# Patient Record
Sex: MALE | Race: WHITE | NOT HISPANIC OR LATINO | Employment: OTHER | URBAN - METROPOLITAN AREA
[De-identification: names, ages, dates, MRNs, and addresses within clinical notes are randomized per-mention and may not be internally consistent; named-entity substitution may affect disease eponyms.]

---

## 2018-02-27 PROBLEM — R53.82 CHRONIC FATIGUE: Status: ACTIVE | Noted: 2017-11-30

## 2018-02-27 PROBLEM — K21.9 GASTROESOPHAGEAL REFLUX DISEASE WITHOUT ESOPHAGITIS: Status: ACTIVE | Noted: 2017-02-27

## 2018-03-01 PROCEDURE — 82570 ASSAY OF URINE CREATININE: CPT | Performed by: FAMILY MEDICINE

## 2018-03-01 PROCEDURE — 82043 UR ALBUMIN QUANTITATIVE: CPT | Performed by: FAMILY MEDICINE

## 2018-03-01 PROCEDURE — 81001 URINALYSIS AUTO W/SCOPE: CPT | Performed by: FAMILY MEDICINE

## 2018-03-20 PROCEDURE — 84100 ASSAY OF PHOSPHORUS: CPT | Performed by: INTERNAL MEDICINE

## 2018-03-20 PROCEDURE — 82565 ASSAY OF CREATININE: CPT | Performed by: INTERNAL MEDICINE

## 2018-03-20 PROCEDURE — 83970 ASSAY OF PARATHORMONE: CPT | Performed by: INTERNAL MEDICINE

## 2018-03-20 PROCEDURE — 82310 ASSAY OF CALCIUM: CPT | Performed by: INTERNAL MEDICINE

## 2018-06-14 PROCEDURE — 82570 ASSAY OF URINE CREATININE: CPT | Performed by: INTERNAL MEDICINE

## 2018-06-14 PROCEDURE — 82043 UR ALBUMIN QUANTITATIVE: CPT | Performed by: INTERNAL MEDICINE

## 2019-04-09 PROCEDURE — 86256 FLUORESCENT ANTIBODY TITER: CPT | Performed by: INTERNAL MEDICINE

## 2019-04-09 PROCEDURE — 82784 ASSAY IGA/IGD/IGG/IGM EACH: CPT | Performed by: INTERNAL MEDICINE

## 2020-04-17 PROBLEM — Z85.51 HISTORY OF BLADDER CANCER: Status: ACTIVE | Noted: 2020-04-17

## 2020-04-17 PROBLEM — K43.5 PARASTOMAL HERNIA OF ILEAL CONDUIT: Status: ACTIVE | Noted: 2020-04-17

## 2020-04-17 PROBLEM — R31.0 GROSS HEMATURIA: Status: ACTIVE | Noted: 2020-04-17

## 2020-04-24 PROBLEM — N36.8 URETHRAL BLEEDING: Status: ACTIVE | Noted: 2020-04-24

## 2020-04-24 PROCEDURE — 88108 CYTOPATH CONCENTRATE TECH: CPT | Performed by: UROLOGY

## 2020-05-12 RX ORDER — LISINOPRIL 10 MG/1
10 TABLET ORAL DAILY
COMMUNITY

## 2020-05-12 RX ORDER — ATORVASTATIN CALCIUM 40 MG/1
40 TABLET, FILM COATED ORAL DAILY
COMMUNITY

## 2020-05-12 RX ORDER — GLIMEPIRIDE 4 MG/1
4 TABLET ORAL
COMMUNITY

## 2020-05-15 DIAGNOSIS — Z01.812 PRE-PROCEDURAL LABORATORY EXAMINATION: Primary | ICD-10-CM

## 2020-05-16 ENCOUNTER — LAB SERVICES (OUTPATIENT)
Dept: LAB | Age: 75
End: 2020-05-16

## 2020-05-16 DIAGNOSIS — Z01.812 PRE-PROCEDURAL LABORATORY EXAMINATION: ICD-10-CM

## 2020-05-16 LAB
SARS-COV-2 RNA SPEC QL NAA+PROBE: NOT DETECTED
SERVICE CMNT-IMP: NORMAL
SPECIMEN SOURCE: NORMAL

## 2020-05-19 ENCOUNTER — HOSPITAL ENCOUNTER (OUTPATIENT)
Age: 75
Discharge: HOME | End: 2020-05-19
Attending: UROLOGY | Admitting: UROLOGY

## 2020-05-19 ENCOUNTER — ANESTHESIA (OUTPATIENT)
Dept: SURGERY | Age: 75
End: 2020-05-19

## 2020-05-19 ENCOUNTER — ANESTHESIA EVENT (OUTPATIENT)
Dept: SURGERY | Age: 75
End: 2020-05-19

## 2020-05-19 LAB
GLUCOSE BLDC GLUCOMTR-MCNC: 158 MG/DL (ref 70–99)
GLUCOSE BLDC GLUCOMTR-MCNC: 160 MG/DL (ref 70–99)

## 2020-05-19 PROCEDURE — 10002807 HB RX 258: Performed by: ANESTHESIOLOGY

## 2020-05-19 PROCEDURE — 10002801 HB RX 250 W/O HCPCS: Performed by: ANESTHESIOLOGY

## 2020-05-19 PROCEDURE — 13000003 HB ANESTHESIA  GENERAL EA ADD MINUTE: Performed by: UROLOGY

## 2020-05-19 PROCEDURE — 10002800 HB RX 250 W HCPCS: Performed by: ANESTHESIOLOGY

## 2020-05-19 PROCEDURE — 10002807 HB RX 258

## 2020-05-19 PROCEDURE — 10004451 HB PACU RECOVERY 1ST 30 MINUTES: Performed by: UROLOGY

## 2020-05-19 PROCEDURE — 10002803 HB RX 637: Performed by: UROLOGY

## 2020-05-19 PROCEDURE — 88305 TISSUE EXAM BY PATHOLOGIST: CPT

## 2020-05-19 PROCEDURE — 13000036 HB COMPLEX  CASE S/U + 1ST 15 MIN: Performed by: UROLOGY

## 2020-05-19 PROCEDURE — 82962 GLUCOSE BLOOD TEST: CPT

## 2020-05-19 PROCEDURE — 13000037 HB COMPLEX CASE EACH ADD MINUTE: Performed by: UROLOGY

## 2020-05-19 PROCEDURE — 10002800 HB RX 250 W HCPCS: Performed by: UROLOGY

## 2020-05-19 PROCEDURE — 13000002 HB ANESTHESIA  GENERAL  S/U + 1ST 15 MIN: Performed by: UROLOGY

## 2020-05-19 PROCEDURE — 10002803 HB RX 637

## 2020-05-19 PROCEDURE — 13000001 HB PHASE II RECOVERY EA 30 MINUTES: Performed by: UROLOGY

## 2020-05-19 RX ORDER — DEXTROSE MONOHYDRATE 25 G/50ML
25 INJECTION, SOLUTION INTRAVENOUS PRN
Status: DISCONTINUED | OUTPATIENT
Start: 2020-05-19 | End: 2020-05-19 | Stop reason: HOSPADM

## 2020-05-19 RX ORDER — LIDOCAINE AND PRILOCAINE 25; 25 MG/G; MG/G
1 CREAM TOPICAL PRN
Status: DISCONTINUED | OUTPATIENT
Start: 2020-05-19 | End: 2020-05-19 | Stop reason: HOSPADM

## 2020-05-19 RX ORDER — MIDAZOLAM HYDROCHLORIDE 1 MG/ML
INJECTION, SOLUTION INTRAMUSCULAR; INTRAVENOUS PRN
Status: DISCONTINUED | OUTPATIENT
Start: 2020-05-19 | End: 2020-05-19

## 2020-05-19 RX ORDER — HYDROCODONE BITARTRATE AND ACETAMINOPHEN 5; 325 MG/1; MG/1
1 TABLET ORAL EVERY 6 HOURS PRN
Qty: 10 TABLET | Refills: 0 | Status: SHIPPED | OUTPATIENT
Start: 2020-05-19

## 2020-05-19 RX ORDER — PROPOFOL 10 MG/ML
INJECTION, EMULSION INTRAVENOUS PRN
Status: DISCONTINUED | OUTPATIENT
Start: 2020-05-19 | End: 2020-05-19

## 2020-05-19 RX ORDER — NEOSTIGMINE METHYLSULFATE 1 MG/ML
INJECTION, SOLUTION INTRAVENOUS PRN
Status: DISCONTINUED | OUTPATIENT
Start: 2020-05-19 | End: 2020-05-19

## 2020-05-19 RX ORDER — METOCLOPRAMIDE HYDROCHLORIDE 5 MG/ML
5 INJECTION INTRAMUSCULAR; INTRAVENOUS EVERY 6 HOURS PRN
Status: DISCONTINUED | OUTPATIENT
Start: 2020-05-19 | End: 2020-05-19 | Stop reason: HOSPADM

## 2020-05-19 RX ORDER — ROCURONIUM BROMIDE 10 MG/ML
INJECTION, SOLUTION INTRAVENOUS PRN
Status: DISCONTINUED | OUTPATIENT
Start: 2020-05-19 | End: 2020-05-19

## 2020-05-19 RX ORDER — 0.9 % SODIUM CHLORIDE 0.9 %
2 VIAL (ML) INJECTION EVERY 12 HOURS SCHEDULED
Status: DISCONTINUED | OUTPATIENT
Start: 2020-05-19 | End: 2020-05-19 | Stop reason: HOSPADM

## 2020-05-19 RX ORDER — DEXAMETHASONE SODIUM PHOSPHATE 4 MG/ML
INJECTION, SOLUTION INTRA-ARTICULAR; INTRALESIONAL; INTRAMUSCULAR; INTRAVENOUS; SOFT TISSUE PRN
Status: DISCONTINUED | OUTPATIENT
Start: 2020-05-19 | End: 2020-05-19

## 2020-05-19 RX ORDER — FAMOTIDINE 20 MG/1
TABLET, FILM COATED ORAL
Status: DISCONTINUED
Start: 2020-05-19 | End: 2020-05-19 | Stop reason: HOSPADM

## 2020-05-19 RX ORDER — CHLORHEXIDINE GLUCONATE ORAL RINSE 1.2 MG/ML
15 SOLUTION DENTAL
Status: COMPLETED | OUTPATIENT
Start: 2020-05-19 | End: 2020-05-19

## 2020-05-19 RX ORDER — SODIUM CHLORIDE, SODIUM LACTATE, POTASSIUM CHLORIDE, CALCIUM CHLORIDE 600; 310; 30; 20 MG/100ML; MG/100ML; MG/100ML; MG/100ML
INJECTION, SOLUTION INTRAVENOUS CONTINUOUS PRN
Status: DISCONTINUED | OUTPATIENT
Start: 2020-05-19 | End: 2020-05-19

## 2020-05-19 RX ORDER — DOCUSATE SODIUM 100 MG/1
100 CAPSULE, LIQUID FILLED ORAL 2 TIMES DAILY PRN
Qty: 30 CAPSULE | Refills: 0 | Status: SHIPPED | OUTPATIENT
Start: 2020-05-19

## 2020-05-19 RX ORDER — LIDOCAINE HYDROCHLORIDE 10 MG/ML
5-10 INJECTION, SOLUTION INFILTRATION; PERINEURAL PRN
Status: DISCONTINUED | OUTPATIENT
Start: 2020-05-19 | End: 2020-05-19 | Stop reason: HOSPADM

## 2020-05-19 RX ORDER — HUMAN INSULIN 100 [IU]/ML
INJECTION, SOLUTION SUBCUTANEOUS
Status: DISCONTINUED | OUTPATIENT
Start: 2020-05-19 | End: 2020-05-19 | Stop reason: HOSPADM

## 2020-05-19 RX ORDER — CHLORHEXIDINE GLUCONATE ORAL RINSE 1.2 MG/ML
SOLUTION DENTAL
Status: DISCONTINUED
Start: 2020-05-19 | End: 2020-05-19 | Stop reason: HOSPADM

## 2020-05-19 RX ORDER — ONDANSETRON 2 MG/ML
4 INJECTION INTRAMUSCULAR; INTRAVENOUS 2 TIMES DAILY PRN
Status: DISCONTINUED | OUTPATIENT
Start: 2020-05-19 | End: 2020-05-19 | Stop reason: HOSPADM

## 2020-05-19 RX ORDER — HYDRALAZINE HYDROCHLORIDE 20 MG/ML
5 INJECTION INTRAMUSCULAR; INTRAVENOUS EVERY 10 MIN PRN
Status: DISCONTINUED | OUTPATIENT
Start: 2020-05-19 | End: 2020-05-19 | Stop reason: HOSPADM

## 2020-05-19 RX ORDER — SODIUM CHLORIDE, SODIUM LACTATE, POTASSIUM CHLORIDE, CALCIUM CHLORIDE 600; 310; 30; 20 MG/100ML; MG/100ML; MG/100ML; MG/100ML
INJECTION, SOLUTION INTRAVENOUS
Status: COMPLETED
Start: 2020-05-19 | End: 2020-05-19

## 2020-05-19 RX ORDER — ONDANSETRON 2 MG/ML
INJECTION INTRAMUSCULAR; INTRAVENOUS PRN
Status: DISCONTINUED | OUTPATIENT
Start: 2020-05-19 | End: 2020-05-19

## 2020-05-19 RX ORDER — SODIUM CHLORIDE, SODIUM LACTATE, POTASSIUM CHLORIDE, CALCIUM CHLORIDE 600; 310; 30; 20 MG/100ML; MG/100ML; MG/100ML; MG/100ML
INJECTION, SOLUTION INTRAVENOUS CONTINUOUS
Status: DISCONTINUED | OUTPATIENT
Start: 2020-05-19 | End: 2020-05-19 | Stop reason: HOSPADM

## 2020-05-19 RX ORDER — FAMOTIDINE 20 MG/1
20 TABLET, FILM COATED ORAL
Status: COMPLETED | OUTPATIENT
Start: 2020-05-19 | End: 2020-05-19

## 2020-05-19 RX ORDER — GLYCOPYRROLATE 0.2 MG/ML
INJECTION, SOLUTION INTRAMUSCULAR; INTRAVENOUS PRN
Status: DISCONTINUED | OUTPATIENT
Start: 2020-05-19 | End: 2020-05-19

## 2020-05-19 RX ADMIN — SODIUM CHLORIDE, POTASSIUM CHLORIDE, SODIUM LACTATE AND CALCIUM CHLORIDE: 600; 310; 30; 20 INJECTION, SOLUTION INTRAVENOUS at 08:08

## 2020-05-19 RX ADMIN — ONDANSETRON 4 MG: 2 INJECTION INTRAMUSCULAR; INTRAVENOUS at 08:18

## 2020-05-19 RX ADMIN — FENTANYL CITRATE 100 MCG: 50 INJECTION INTRAMUSCULAR; INTRAVENOUS at 08:08

## 2020-05-19 RX ADMIN — MIDAZOLAM HYDROCHLORIDE 2 MG: 1 INJECTION, SOLUTION INTRAMUSCULAR; INTRAVENOUS at 08:08

## 2020-05-19 RX ADMIN — GLYCOPYRROLATE 0.8 MG: 0.2 INJECTION, SOLUTION INTRAMUSCULAR; INTRAVENOUS at 08:58

## 2020-05-19 RX ADMIN — DEXAMETHASONE SODIUM PHOSPHATE 4 MG: 4 INJECTION, SOLUTION INTRAMUSCULAR; INTRAVENOUS at 08:18

## 2020-05-19 RX ADMIN — PROPOFOL 150 MG: 10 INJECTION, EMULSION INTRAVENOUS at 08:15

## 2020-05-19 RX ADMIN — CHLORHEXIDINE GLUCONATE 15 ML: 1.2 RINSE ORAL at 07:40

## 2020-05-19 RX ADMIN — LABETALOL HYDROCHLORIDE 5 MG: 5 INJECTION, SOLUTION INTRAVENOUS at 09:07

## 2020-05-19 RX ADMIN — CEFAZOLIN SODIUM 2000 MG: 300 INJECTION, POWDER, LYOPHILIZED, FOR SOLUTION INTRAVENOUS at 08:12

## 2020-05-19 RX ADMIN — ROCURONIUM BROMIDE 40 MG: 50 INJECTION, SOLUTION INTRAVENOUS at 08:15

## 2020-05-19 RX ADMIN — SODIUM CHLORIDE, POTASSIUM CHLORIDE, SODIUM LACTATE AND CALCIUM CHLORIDE: 600; 310; 30; 20 INJECTION, SOLUTION INTRAVENOUS at 07:44

## 2020-05-19 RX ADMIN — SODIUM CHLORIDE, POTASSIUM CHLORIDE, SODIUM LACTATE AND CALCIUM CHLORIDE 100 ML/HR: 600; 310; 30; 20 INJECTION, SOLUTION INTRAVENOUS at 09:55

## 2020-05-19 RX ADMIN — FAMOTIDINE 20 MG: 20 TABLET, FILM COATED ORAL at 07:40

## 2020-05-19 RX ADMIN — NEOSTIGMINE METHYLSULFATE 4 MG: 1 INJECTION INTRAVENOUS at 08:58

## 2020-05-19 ASSESSMENT — PAIN SCALES - GENERAL
PAINLEVEL_OUTOF10: 3
PAINLEVEL_OUTOF10: 0
PAINLEVEL_OUTOF10: 3
PAINLEVEL_OUTOF10: 0
PAINLEVEL_OUTOF10: 3

## 2020-05-20 LAB — PATHOLOGIST NAME: NORMAL

## 2020-06-05 PROBLEM — C68.0: Status: ACTIVE | Noted: 2020-06-05

## 2020-07-01 PROBLEM — N50.812 PAIN IN LEFT TESTICLE: Status: ACTIVE | Noted: 2020-07-01

## 2020-07-11 ENCOUNTER — LAB ENCOUNTER (OUTPATIENT)
Dept: LAB | Facility: HOSPITAL | Age: 75
End: 2020-07-11
Attending: UROLOGY
Payer: MEDICARE

## 2020-07-11 DIAGNOSIS — C68.9 UROTHELIAL CARCINOMA (HCC): ICD-10-CM

## 2020-07-11 LAB — SARS-COV-2 RNA RESP QL NAA+PROBE: NOT DETECTED

## 2020-07-13 ENCOUNTER — ANESTHESIA EVENT (OUTPATIENT)
Dept: SURGERY | Facility: HOSPITAL | Age: 75
DRG: 674 | End: 2020-07-13
Payer: MEDICARE

## 2020-07-14 ENCOUNTER — HOSPITAL ENCOUNTER (INPATIENT)
Facility: HOSPITAL | Age: 75
LOS: 5 days | Discharge: HOME HEALTH | DRG: 674 | End: 2020-07-20
Attending: UROLOGY | Admitting: UROLOGY
Payer: MEDICARE

## 2020-07-14 ENCOUNTER — ANESTHESIA (OUTPATIENT)
Dept: SURGERY | Facility: HOSPITAL | Age: 75
DRG: 674 | End: 2020-07-14
Payer: MEDICARE

## 2020-07-14 DIAGNOSIS — C68.9 UROTHELIAL CARCINOMA (HCC): Primary | ICD-10-CM

## 2020-07-14 LAB
ANION GAP SERPL CALC-SCNC: 5 MMOL/L (ref 0–18)
BUN BLD-MCNC: 29 MG/DL (ref 7–18)
BUN/CREAT SERPL: 17.3 (ref 10–20)
CALCIUM BLD-MCNC: 8.4 MG/DL (ref 8.5–10.1)
CHLORIDE SERPL-SCNC: 110 MMOL/L (ref 98–112)
CO2 SERPL-SCNC: 20 MMOL/L (ref 21–32)
CREAT BLD-MCNC: 1.68 MG/DL (ref 0.7–1.3)
DEPRECATED RDW RBC AUTO: 42.5 FL (ref 35.1–46.3)
ERYTHROCYTE [DISTWIDTH] IN BLOOD BY AUTOMATED COUNT: 12.3 % (ref 11–15)
GLUCOSE BLD-MCNC: 170 MG/DL (ref 70–99)
GLUCOSE BLD-MCNC: 240 MG/DL (ref 70–99)
GLUCOSE BLD-MCNC: 268 MG/DL (ref 70–99)
GLUCOSE BLD-MCNC: 269 MG/DL (ref 70–99)
HCT VFR BLD AUTO: 38.3 % (ref 39–53)
HGB BLD-MCNC: 12.5 G/DL (ref 13–17.5)
MCH RBC QN AUTO: 30.9 PG (ref 26–34)
MCHC RBC AUTO-ENTMCNC: 32.6 G/DL (ref 31–37)
MCV RBC AUTO: 94.8 FL (ref 80–100)
OSMOLALITY SERPL CALC.SUM OF ELEC: 295 MOSM/KG (ref 275–295)
PLATELET # BLD AUTO: 191 10(3)UL (ref 150–450)
POTASSIUM SERPL-SCNC: 4.9 MMOL/L (ref 3.5–5.1)
RBC # BLD AUTO: 4.04 X10(6)UL (ref 3.8–5.8)
SODIUM SERPL-SCNC: 135 MMOL/L (ref 136–145)
WBC # BLD AUTO: 17.1 X10(3) UL (ref 4–11)

## 2020-07-14 PROCEDURE — 88309 TISSUE EXAM BY PATHOLOGIST: CPT | Performed by: UROLOGY

## 2020-07-14 PROCEDURE — 76942 ECHO GUIDE FOR BIOPSY: CPT | Performed by: ANESTHESIOLOGY

## 2020-07-14 PROCEDURE — 0DQP0ZZ REPAIR RECTUM, OPEN APPROACH: ICD-10-PCS | Performed by: SURGERY

## 2020-07-14 PROCEDURE — 93010 ELECTROCARDIOGRAM REPORT: CPT | Performed by: INTERNAL MEDICINE

## 2020-07-14 PROCEDURE — 0D1N0Z4 BYPASS SIGMOID COLON TO CUTANEOUS, OPEN APPROACH: ICD-10-PCS | Performed by: SURGERY

## 2020-07-14 PROCEDURE — 93005 ELECTROCARDIOGRAM TRACING: CPT

## 2020-07-14 PROCEDURE — 88342 IMHCHEM/IMCYTCHM 1ST ANTB: CPT | Performed by: UROLOGY

## 2020-07-14 PROCEDURE — 85027 COMPLETE CBC AUTOMATED: CPT | Performed by: UROLOGY

## 2020-07-14 PROCEDURE — 82962 GLUCOSE BLOOD TEST: CPT

## 2020-07-14 PROCEDURE — 80048 BASIC METABOLIC PNL TOTAL CA: CPT | Performed by: UROLOGY

## 2020-07-14 PROCEDURE — 88321 CONSLTJ&REPRT SLD PREP ELSWR: CPT | Performed by: UROLOGY

## 2020-07-14 PROCEDURE — 0DNW0ZZ RELEASE PERITONEUM, OPEN APPROACH: ICD-10-PCS | Performed by: SURGERY

## 2020-07-14 PROCEDURE — 0TTD0ZZ RESECTION OF URETHRA, OPEN APPROACH: ICD-10-PCS | Performed by: UROLOGY

## 2020-07-14 PROCEDURE — 88305 TISSUE EXAM BY PATHOLOGIST: CPT | Performed by: UROLOGY

## 2020-07-14 PROCEDURE — 85014 HEMATOCRIT: CPT | Performed by: HOSPITALIST

## 2020-07-14 PROCEDURE — 85018 HEMOGLOBIN: CPT | Performed by: HOSPITALIST

## 2020-07-14 PROCEDURE — 88331 PATH CONSLTJ SURG 1 BLK 1SPC: CPT | Performed by: UROLOGY

## 2020-07-14 PROCEDURE — 88307 TISSUE EXAM BY PATHOLOGIST: CPT | Performed by: UROLOGY

## 2020-07-14 DEVICE — SEPRAFILM ADHESION BARRIER (MEMBRANE) IS A STERILE, BIORESORBABLE, TRANSLUCENT ADHESION BARRIER COMPOSED OF TWO ANIONIC POLYSACCHARIDES, SODIUM HYALURONATE (HA) AND CARBOXYMETHYLCELLULOSE (CMC).
Type: IMPLANTABLE DEVICE | Site: ABDOMEN | Status: FUNCTIONAL
Brand: SEPRAFILM

## 2020-07-14 RX ORDER — HYDROCODONE BITARTRATE AND ACETAMINOPHEN 5; 325 MG/1; MG/1
2 TABLET ORAL 3 TIMES DAILY
Status: DISCONTINUED | OUTPATIENT
Start: 2020-07-14 | End: 2020-07-15

## 2020-07-14 RX ORDER — NALOXONE HYDROCHLORIDE 0.4 MG/ML
80 INJECTION, SOLUTION INTRAMUSCULAR; INTRAVENOUS; SUBCUTANEOUS AS NEEDED
Status: ACTIVE | OUTPATIENT
Start: 2020-07-14 | End: 2020-07-14

## 2020-07-14 RX ORDER — MIDAZOLAM HYDROCHLORIDE 1 MG/ML
1 INJECTION INTRAMUSCULAR; INTRAVENOUS EVERY 5 MIN PRN
Status: ACTIVE | OUTPATIENT
Start: 2020-07-14 | End: 2020-07-14

## 2020-07-14 RX ORDER — METRONIDAZOLE 500 MG/100ML
500 INJECTION, SOLUTION INTRAVENOUS EVERY 8 HOURS
Status: DISCONTINUED | OUTPATIENT
Start: 2020-07-14 | End: 2020-07-16

## 2020-07-14 RX ORDER — CEFAZOLIN SODIUM/WATER 2 G/20 ML
2 SYRINGE (ML) INTRAVENOUS ONCE
Status: COMPLETED | OUTPATIENT
Start: 2020-07-14 | End: 2020-07-14

## 2020-07-14 RX ORDER — METRONIDAZOLE 500 MG/100ML
INJECTION, SOLUTION INTRAVENOUS AS NEEDED
Status: DISCONTINUED | OUTPATIENT
Start: 2020-07-14 | End: 2020-07-14 | Stop reason: SURG

## 2020-07-14 RX ORDER — MEPERIDINE HYDROCHLORIDE 25 MG/ML
12.5 INJECTION INTRAMUSCULAR; INTRAVENOUS; SUBCUTANEOUS AS NEEDED
Status: DISCONTINUED | OUTPATIENT
Start: 2020-07-14 | End: 2020-07-14 | Stop reason: HOSPADM

## 2020-07-14 RX ORDER — LISINOPRIL 10 MG/1
10 TABLET ORAL DAILY
COMMUNITY

## 2020-07-14 RX ORDER — HYDROMORPHONE HYDROCHLORIDE 1 MG/ML
1 INJECTION, SOLUTION INTRAMUSCULAR; INTRAVENOUS; SUBCUTANEOUS EVERY 2 HOUR PRN
Status: DISCONTINUED | OUTPATIENT
Start: 2020-07-14 | End: 2020-07-15

## 2020-07-14 RX ORDER — HYDROMORPHONE HYDROCHLORIDE 1 MG/ML
0.5 INJECTION, SOLUTION INTRAMUSCULAR; INTRAVENOUS; SUBCUTANEOUS EVERY 2 HOUR PRN
Status: DISCONTINUED | OUTPATIENT
Start: 2020-07-14 | End: 2020-07-15

## 2020-07-14 RX ORDER — LIDOCAINE HYDROCHLORIDE 10 MG/ML
INJECTION, SOLUTION EPIDURAL; INFILTRATION; INTRACAUDAL; PERINEURAL AS NEEDED
Status: DISCONTINUED | OUTPATIENT
Start: 2020-07-14 | End: 2020-07-14 | Stop reason: SURG

## 2020-07-14 RX ORDER — PHENYLEPHRINE HCL 10 MG/ML
VIAL (ML) INJECTION AS NEEDED
Status: DISCONTINUED | OUTPATIENT
Start: 2020-07-14 | End: 2020-07-14 | Stop reason: SURG

## 2020-07-14 RX ORDER — ONDANSETRON 2 MG/ML
4 INJECTION INTRAMUSCULAR; INTRAVENOUS AS NEEDED
Status: ACTIVE | OUTPATIENT
Start: 2020-07-14 | End: 2020-07-14

## 2020-07-14 RX ORDER — LABETALOL HYDROCHLORIDE 5 MG/ML
5 INJECTION, SOLUTION INTRAVENOUS EVERY 5 MIN PRN
Status: ACTIVE | OUTPATIENT
Start: 2020-07-14 | End: 2020-07-14

## 2020-07-14 RX ORDER — ONDANSETRON 4 MG/1
4 TABLET, FILM COATED ORAL EVERY 6 HOURS PRN
Status: DISCONTINUED | OUTPATIENT
Start: 2020-07-14 | End: 2020-07-20

## 2020-07-14 RX ORDER — ONDANSETRON 2 MG/ML
INJECTION INTRAMUSCULAR; INTRAVENOUS AS NEEDED
Status: DISCONTINUED | OUTPATIENT
Start: 2020-07-14 | End: 2020-07-14 | Stop reason: SURG

## 2020-07-14 RX ORDER — BUPIVACAINE HYDROCHLORIDE 2.5 MG/ML
INJECTION, SOLUTION EPIDURAL; INFILTRATION; INTRACAUDAL AS NEEDED
Status: DISCONTINUED | OUTPATIENT
Start: 2020-07-14 | End: 2020-07-14 | Stop reason: SURG

## 2020-07-14 RX ORDER — HEPARIN SODIUM 5000 [USP'U]/ML
5000 INJECTION, SOLUTION INTRAVENOUS; SUBCUTANEOUS EVERY 8 HOURS SCHEDULED
Status: DISCONTINUED | OUTPATIENT
Start: 2020-07-15 | End: 2020-07-20

## 2020-07-14 RX ORDER — HYDROCODONE BITARTRATE AND ACETAMINOPHEN 5; 325 MG/1; MG/1
1 TABLET ORAL AS NEEDED
Status: DISCONTINUED | OUTPATIENT
Start: 2020-07-14 | End: 2020-07-14 | Stop reason: HOSPADM

## 2020-07-14 RX ORDER — ONDANSETRON 2 MG/ML
4 INJECTION INTRAMUSCULAR; INTRAVENOUS EVERY 6 HOURS PRN
Status: DISCONTINUED | OUTPATIENT
Start: 2020-07-14 | End: 2020-07-20

## 2020-07-14 RX ORDER — SODIUM CHLORIDE 9 MG/ML
INJECTION, SOLUTION INTRAVENOUS CONTINUOUS
Status: DISCONTINUED | OUTPATIENT
Start: 2020-07-14 | End: 2020-07-15

## 2020-07-14 RX ORDER — BUPIVACAINE HYDROCHLORIDE AND EPINEPHRINE 5; 5 MG/ML; UG/ML
INJECTION, SOLUTION EPIDURAL; INTRACAUDAL; PERINEURAL AS NEEDED
Status: DISCONTINUED | OUTPATIENT
Start: 2020-07-14 | End: 2020-07-14 | Stop reason: HOSPADM

## 2020-07-14 RX ORDER — DOCUSATE SODIUM 100 MG/1
100 CAPSULE, LIQUID FILLED ORAL 2 TIMES DAILY
Status: DISCONTINUED | OUTPATIENT
Start: 2020-07-14 | End: 2020-07-16

## 2020-07-14 RX ORDER — INSULIN ASPART 100 [IU]/ML
INJECTION, SOLUTION INTRAVENOUS; SUBCUTANEOUS
Status: COMPLETED
Start: 2020-07-14 | End: 2020-07-14

## 2020-07-14 RX ORDER — INSULIN ASPART 100 [IU]/ML
INJECTION, SOLUTION INTRAVENOUS; SUBCUTANEOUS ONCE
Status: COMPLETED | OUTPATIENT
Start: 2020-07-14 | End: 2020-07-14

## 2020-07-14 RX ORDER — NEOSTIGMINE METHYLSULFATE 1 MG/ML
INJECTION INTRAVENOUS AS NEEDED
Status: DISCONTINUED | OUTPATIENT
Start: 2020-07-14 | End: 2020-07-14 | Stop reason: SURG

## 2020-07-14 RX ORDER — SODIUM CHLORIDE, SODIUM LACTATE, POTASSIUM CHLORIDE, CALCIUM CHLORIDE 600; 310; 30; 20 MG/100ML; MG/100ML; MG/100ML; MG/100ML
INJECTION, SOLUTION INTRAVENOUS CONTINUOUS
Status: DISCONTINUED | OUTPATIENT
Start: 2020-07-14 | End: 2020-07-14 | Stop reason: HOSPADM

## 2020-07-14 RX ORDER — BACITRACIN 50000 [USP'U]/1
INJECTION, POWDER, LYOPHILIZED, FOR SOLUTION INTRAMUSCULAR AS NEEDED
Status: DISCONTINUED | OUTPATIENT
Start: 2020-07-14 | End: 2020-07-14 | Stop reason: HOSPADM

## 2020-07-14 RX ORDER — GLYCOPYRROLATE 0.2 MG/ML
INJECTION, SOLUTION INTRAMUSCULAR; INTRAVENOUS AS NEEDED
Status: DISCONTINUED | OUTPATIENT
Start: 2020-07-14 | End: 2020-07-14 | Stop reason: SURG

## 2020-07-14 RX ORDER — HYDROMORPHONE HYDROCHLORIDE 1 MG/ML
0.4 INJECTION, SOLUTION INTRAMUSCULAR; INTRAVENOUS; SUBCUTANEOUS EVERY 5 MIN PRN
Status: DISPENSED | OUTPATIENT
Start: 2020-07-14 | End: 2020-07-14

## 2020-07-14 RX ORDER — DEXTROSE MONOHYDRATE 25 G/50ML
50 INJECTION, SOLUTION INTRAVENOUS
Status: DISCONTINUED | OUTPATIENT
Start: 2020-07-14 | End: 2020-07-14 | Stop reason: HOSPADM

## 2020-07-14 RX ORDER — HYDROCODONE BITARTRATE AND ACETAMINOPHEN 5; 325 MG/1; MG/1
2 TABLET ORAL AS NEEDED
Status: DISCONTINUED | OUTPATIENT
Start: 2020-07-14 | End: 2020-07-14 | Stop reason: HOSPADM

## 2020-07-14 RX ORDER — CEFAZOLIN SODIUM/WATER 2 G/20 ML
SYRINGE (ML) INTRAVENOUS
Status: DISPENSED
Start: 2020-07-14 | End: 2020-07-14

## 2020-07-14 RX ORDER — ATORVASTATIN CALCIUM 40 MG/1
40 TABLET, FILM COATED ORAL NIGHTLY
Status: DISCONTINUED | OUTPATIENT
Start: 2020-07-14 | End: 2020-07-20

## 2020-07-14 RX ORDER — BISACODYL 10 MG
10 SUPPOSITORY, RECTAL RECTAL
Status: DISCONTINUED | OUTPATIENT
Start: 2020-07-14 | End: 2020-07-20

## 2020-07-14 RX ORDER — DEXTROSE MONOHYDRATE 25 G/50ML
50 INJECTION, SOLUTION INTRAVENOUS
Status: DISCONTINUED | OUTPATIENT
Start: 2020-07-14 | End: 2020-07-14

## 2020-07-14 RX ORDER — ATORVASTATIN CALCIUM 40 MG/1
40 TABLET, FILM COATED ORAL NIGHTLY
COMMUNITY
End: 2020-08-12

## 2020-07-14 RX ORDER — ACETAMINOPHEN 500 MG
1000 TABLET ORAL ONCE
Status: DISCONTINUED | OUTPATIENT
Start: 2020-07-14 | End: 2020-07-20

## 2020-07-14 RX ORDER — METOCLOPRAMIDE HYDROCHLORIDE 5 MG/ML
INJECTION INTRAMUSCULAR; INTRAVENOUS AS NEEDED
Status: DISCONTINUED | OUTPATIENT
Start: 2020-07-14 | End: 2020-07-14 | Stop reason: SURG

## 2020-07-14 RX ORDER — DEXTROSE MONOHYDRATE 25 G/50ML
50 INJECTION, SOLUTION INTRAVENOUS
Status: DISCONTINUED | OUTPATIENT
Start: 2020-07-14 | End: 2020-07-20

## 2020-07-14 RX ORDER — ROCURONIUM BROMIDE 10 MG/ML
INJECTION, SOLUTION INTRAVENOUS AS NEEDED
Status: DISCONTINUED | OUTPATIENT
Start: 2020-07-14 | End: 2020-07-14 | Stop reason: SURG

## 2020-07-14 RX ORDER — HYDROCODONE BITARTRATE AND ACETAMINOPHEN 5; 325 MG/1; MG/1
1 TABLET ORAL 3 TIMES DAILY
Status: DISCONTINUED | OUTPATIENT
Start: 2020-07-14 | End: 2020-07-15

## 2020-07-14 RX ORDER — ACETAMINOPHEN 500 MG
1000 TABLET ORAL 3 TIMES DAILY
Status: DISCONTINUED | OUTPATIENT
Start: 2020-07-14 | End: 2020-07-20

## 2020-07-14 RX ORDER — SODIUM CHLORIDE, SODIUM LACTATE, POTASSIUM CHLORIDE, CALCIUM CHLORIDE 600; 310; 30; 20 MG/100ML; MG/100ML; MG/100ML; MG/100ML
INJECTION, SOLUTION INTRAVENOUS CONTINUOUS
Status: DISCONTINUED | OUTPATIENT
Start: 2020-07-14 | End: 2020-07-20

## 2020-07-14 RX ADMIN — ROCURONIUM BROMIDE 10 MG: 10 INJECTION, SOLUTION INTRAVENOUS at 14:02:00

## 2020-07-14 RX ADMIN — ONDANSETRON 4 MG: 2 INJECTION INTRAMUSCULAR; INTRAVENOUS at 07:43:00

## 2020-07-14 RX ADMIN — ROCURONIUM BROMIDE 10 MG: 10 INJECTION, SOLUTION INTRAVENOUS at 13:46:00

## 2020-07-14 RX ADMIN — PHENYLEPHRINE HCL 100 MCG: 10 MG/ML VIAL (ML) INJECTION at 10:19:00

## 2020-07-14 RX ADMIN — ROCURONIUM BROMIDE 50 MG: 10 INJECTION, SOLUTION INTRAVENOUS at 07:38:00

## 2020-07-14 RX ADMIN — SODIUM CHLORIDE, SODIUM LACTATE, POTASSIUM CHLORIDE, CALCIUM CHLORIDE: 600; 310; 30; 20 INJECTION, SOLUTION INTRAVENOUS at 09:22:00

## 2020-07-14 RX ADMIN — CEFAZOLIN SODIUM/WATER 2 G: 2 G/20 ML SYRINGE (ML) INTRAVENOUS at 11:42:00

## 2020-07-14 RX ADMIN — GLYCOPYRROLATE 0.8 MG: 0.2 INJECTION, SOLUTION INTRAMUSCULAR; INTRAVENOUS at 14:50:00

## 2020-07-14 RX ADMIN — ROCURONIUM BROMIDE 20 MG: 10 INJECTION, SOLUTION INTRAVENOUS at 08:37:00

## 2020-07-14 RX ADMIN — ROCURONIUM BROMIDE 30 MG: 10 INJECTION, SOLUTION INTRAVENOUS at 10:46:00

## 2020-07-14 RX ADMIN — ROCURONIUM BROMIDE 10 MG: 10 INJECTION, SOLUTION INTRAVENOUS at 09:35:00

## 2020-07-14 RX ADMIN — BUPIVACAINE HYDROCHLORIDE 60 ML: 2.5 INJECTION, SOLUTION EPIDURAL; INFILTRATION; INTRACAUDAL at 14:48:00

## 2020-07-14 RX ADMIN — ROCURONIUM BROMIDE 20 MG: 10 INJECTION, SOLUTION INTRAVENOUS at 10:16:00

## 2020-07-14 RX ADMIN — CEFAZOLIN SODIUM/WATER 2 G: 2 G/20 ML SYRINGE (ML) INTRAVENOUS at 07:42:00

## 2020-07-14 RX ADMIN — METRONIDAZOLE 500 MG: 500 INJECTION, SOLUTION INTRAVENOUS at 10:37:00

## 2020-07-14 RX ADMIN — ROCURONIUM BROMIDE 10 MG: 10 INJECTION, SOLUTION INTRAVENOUS at 09:50:00

## 2020-07-14 RX ADMIN — LIDOCAINE HYDROCHLORIDE 50 MG: 10 INJECTION, SOLUTION EPIDURAL; INFILTRATION; INTRACAUDAL; PERINEURAL at 07:38:00

## 2020-07-14 RX ADMIN — ROCURONIUM BROMIDE 20 MG: 10 INJECTION, SOLUTION INTRAVENOUS at 13:05:00

## 2020-07-14 RX ADMIN — METOCLOPRAMIDE HYDROCHLORIDE 10 MG: 5 INJECTION INTRAMUSCULAR; INTRAVENOUS at 07:43:00

## 2020-07-14 RX ADMIN — PHENYLEPHRINE HCL 100 MCG: 10 MG/ML VIAL (ML) INJECTION at 12:27:00

## 2020-07-14 RX ADMIN — ROCURONIUM BROMIDE 40 MG: 10 INJECTION, SOLUTION INTRAVENOUS at 12:20:00

## 2020-07-14 RX ADMIN — ROCURONIUM BROMIDE 10 MG: 10 INJECTION, SOLUTION INTRAVENOUS at 11:25:00

## 2020-07-14 RX ADMIN — PHENYLEPHRINE HCL 100 MCG: 10 MG/ML VIAL (ML) INJECTION at 11:31:00

## 2020-07-14 RX ADMIN — ONDANSETRON 4 MG: 2 INJECTION INTRAMUSCULAR; INTRAVENOUS at 14:25:00

## 2020-07-14 RX ADMIN — SODIUM CHLORIDE, SODIUM LACTATE, POTASSIUM CHLORIDE, CALCIUM CHLORIDE: 600; 310; 30; 20 INJECTION, SOLUTION INTRAVENOUS at 12:37:00

## 2020-07-14 RX ADMIN — ROCURONIUM BROMIDE 10 MG: 10 INJECTION, SOLUTION INTRAVENOUS at 09:03:00

## 2020-07-14 RX ADMIN — PHENYLEPHRINE HCL 100 MCG: 10 MG/ML VIAL (ML) INJECTION at 14:25:00

## 2020-07-14 RX ADMIN — PHENYLEPHRINE HCL 100 MCG: 10 MG/ML VIAL (ML) INJECTION at 12:37:00

## 2020-07-14 RX ADMIN — NEOSTIGMINE METHYLSULFATE 5 MG: 1 INJECTION INTRAVENOUS at 14:50:00

## 2020-07-14 NOTE — BRIEF OP NOTE
Pre-Operative Diagnosis: Urothelial carcinoma ([facility] [C68.9]     Post-Operative Diagnosis: Urothelial carcinoma ([facility] [C68.9]      Procedure Performed:   Procedure(s):  TOTAL URETHRECTOMY    Surgeon(s) and Role:     * Swathi Duenas MD - Primary     * Bar Johnson, Private car

## 2020-07-14 NOTE — ANESTHESIA PREPROCEDURE EVALUATION
PRE-OP EVALUATION    Patient Name: Ani Whitlock    Pre-op Diagnosis: Urothelial carcinoma ([facility] [C68.9]    Procedure(s):  TOTAL URETHRECTOMY    Surgeon(s) and Role:     Imelda Cordova MD - Primary    Pre-op vitals reviewed.   Temp: 97.1 °F (36.2 °C)  Pulse >4    (+) obesity  (+) hypertension   (+) hyperlipidemia                                  Endo/Other      (+) diabetes  type 2,                          Pulmonary                 (-) recent URI          Neuro/Psych                              Bladder ca bleeding and infection.     Plan/risks discussed with: patient                Present on Admission:  **None**

## 2020-07-14 NOTE — ANESTHESIA PROCEDURE NOTES
[facility]  Performed by: Dudley Luong CRNA  Authorized by: Frandy Gates MD       General Information and Staff    Start Time:   Anesthesiologist: Frandy Gates MD  CRNA:  Dudley Luong CRNA  Performed by:  CRNA and anesthesiologist  Mariana Darnell

## 2020-07-14 NOTE — CONSULTS
[facility] hospitalist initial consult note  PCP Shannan Saleem MD  Consulted at the request of Dr. Timoteo Adair  Reason for consult medical co-management  HPI 77 yo male with multiple medical problems including but not limited to HTN, HL, DM2  bladder cancer, here s/p education: Not on file      Highest education level: Not on file    Occupational History      Not on file    Social Needs      Financial resource strain: Not on file      Food insecurity:        Worry: Not on file        Inability: Not on file      Transpo reviewed and negative except as in HPI  PE    07/14/20  1644   BP: 132/73   Pulse: 98   Resp: 19   Temp: 98.2 °F (36.8 °C)     Gen: awake, alert, no respiratory distress  HEENT; mm dry, anicteric, EOMI, NG in place  Neck supple  Lymph no tender cervical LA

## 2020-07-14 NOTE — H&P
Pre-op Diagnosis: Urothelial carcinoma ([facility] [C68.9]    The above referenced H&P by Dr. Jero Marte on 7/7/2020 was reviewed by Rocio Valentin MD on 7/14/2020, the patient was examined and no significant changes have occurred in the patient's condition since the

## 2020-07-14 NOTE — PLAN OF CARE
PT IS A&O X 4. HE IS NPO ONLY SIPS W/MEDS. HE IS PULSE OX. HE HAS NG TUBE TO THE RIGHT NARE. HAS COLOSTOMY AND UROSTOMY. HE HAS IV FLUIDS INFUSING. HE IS MANAGING PAIN W/PRN DILAUDID. HE HAS JUAN JOSÉ X 2. WILL CONTINUE TO MONITOR.          NURSING ADMISSION NOTE as needed  - Evaluate fluid balance  Outcome: Progressing  Goal: Maintains or returns to baseline bowel function  Description  INTERVENTIONS:  - Assess bowel function  - Maintain adequate hydration with IV or PO as ordered and tolerated  - Evaluate effecti

## 2020-07-14 NOTE — ANESTHESIA PROCEDURE NOTES
Airway  Urgency: elective      General Information and Staff    Patient location during procedure: OR  Anesthesiologist: Sameera Borjas MD  Resident/CRNA: Tono Buchanan CRNA  Performed: CRNA     Indications and Patient Condition  Indications for airway man

## 2020-07-14 NOTE — ANESTHESIA POSTPROCEDURE EVALUATION
[address] [place] [address] Patient Status:  Inpatient   Age/Gender 76year old male MRN [id number]   Location [facility] Attending Eduardo Salomon MD   Hosp Day # 0 PCP Devika Stover MD       Anesthesia Post-op Note    Pro

## 2020-07-15 PROBLEM — S36.63XA: Status: ACTIVE | Noted: 2020-07-15

## 2020-07-15 LAB
ANION GAP SERPL CALC-SCNC: 4 MMOL/L (ref 0–18)
ANION GAP SERPL CALC-SCNC: 7 MMOL/L (ref 0–18)
ATRIAL RATE: 135 BPM
BASOPHILS # BLD AUTO: 0.03 X10(3) UL (ref 0–0.2)
BASOPHILS NFR BLD AUTO: 0.2 %
BUN BLD-MCNC: 42 MG/DL (ref 7–18)
BUN BLD-MCNC: 45 MG/DL (ref 7–18)
BUN/CREAT SERPL: 17 (ref 10–20)
BUN/CREAT SERPL: 18 (ref 10–20)
CALCIUM BLD-MCNC: 7 MG/DL (ref 8.5–10.1)
CALCIUM BLD-MCNC: 7.9 MG/DL (ref 8.5–10.1)
CHLORIDE SERPL-SCNC: 110 MMOL/L (ref 98–112)
CHLORIDE SERPL-SCNC: 117 MMOL/L (ref 98–112)
CK SERPL-CCNC: 1670 U/L (ref 39–308)
CO2 SERPL-SCNC: 18 MMOL/L (ref 21–32)
CO2 SERPL-SCNC: 22 MMOL/L (ref 21–32)
CREAT BLD-MCNC: 2.47 MG/DL (ref 0.7–1.3)
CREAT BLD-MCNC: 2.5 MG/DL (ref 0.7–1.3)
CREAT UR-SCNC: 104 MG/DL
DEPRECATED RDW RBC AUTO: 42.2 FL (ref 35.1–46.3)
EOSINOPHIL # BLD AUTO: 0 X10(3) UL (ref 0–0.7)
EOSINOPHIL NFR BLD AUTO: 0 %
ERYTHROCYTE [DISTWIDTH] IN BLOOD BY AUTOMATED COUNT: 12.3 % (ref 11–15)
GLUCOSE BLD-MCNC: 104 MG/DL (ref 70–99)
GLUCOSE BLD-MCNC: 119 MG/DL (ref 70–99)
GLUCOSE BLD-MCNC: 120 MG/DL (ref 70–99)
GLUCOSE BLD-MCNC: 150 MG/DL (ref 70–99)
GLUCOSE BLD-MCNC: 275 MG/DL (ref 70–99)
GLUCOSE BLD-MCNC: 302 MG/DL (ref 70–99)
GLUCOSE BLD-MCNC: 308 MG/DL (ref 70–99)
GLUCOSE BLD-MCNC: 46 MG/DL (ref 70–99)
GLUCOSE BLD-MCNC: 52 MG/DL (ref 70–99)
GLUCOSE BLD-MCNC: 56 MG/DL (ref 70–99)
GLUCOSE BLD-MCNC: 58 MG/DL (ref 70–99)
GLUCOSE BLD-MCNC: 59 MG/DL (ref 70–99)
GLUCOSE BLD-MCNC: 94 MG/DL (ref 70–99)
HCT VFR BLD AUTO: 32.1 % (ref 39–53)
HCT VFR BLD AUTO: 35 % (ref 39–53)
HGB BLD-MCNC: 10.7 G/DL (ref 13–17.5)
HGB BLD-MCNC: 11.8 G/DL (ref 13–17.5)
IMM GRANULOCYTES # BLD AUTO: 0.09 X10(3) UL (ref 0–1)
IMM GRANULOCYTES NFR BLD: 0.6 %
IONIZED CALCIUM: 1.18 MMOL/L (ref 1.12–1.32)
LYMPHOCYTES # BLD AUTO: 0.98 X10(3) UL (ref 1–4)
LYMPHOCYTES NFR BLD AUTO: 6.5 %
MCH RBC QN AUTO: 31.4 PG (ref 26–34)
MCHC RBC AUTO-ENTMCNC: 33.3 G/DL (ref 31–37)
MCV RBC AUTO: 94.1 FL (ref 80–100)
MONOCYTES # BLD AUTO: 1.34 X10(3) UL (ref 0.1–1)
MONOCYTES NFR BLD AUTO: 8.9 %
NEUTROPHILS # BLD AUTO: 12.61 X10 (3) UL (ref 1.5–7.7)
NEUTROPHILS # BLD AUTO: 12.61 X10(3) UL (ref 1.5–7.7)
NEUTROPHILS NFR BLD AUTO: 83.8 %
OSMOLALITY SERPL CALC.SUM OF ELEC: 303 MOSM/KG (ref 275–295)
OSMOLALITY SERPL CALC.SUM OF ELEC: 304 MOSM/KG (ref 275–295)
P AXIS: 3 DEGREES
P-R INTERVAL: 140 MS
PLATELET # BLD AUTO: 164 10(3)UL (ref 150–450)
POTASSIUM SERPL-SCNC: 4 MMOL/L (ref 3.5–5.1)
POTASSIUM SERPL-SCNC: 6.4 MMOL/L (ref 3.5–5.1)
POTASSIUM SERPL-SCNC: 6.8 MMOL/L (ref 3.5–5.1)
Q-T INTERVAL: 294 MS
QRS DURATION: 88 MS
QTC CALCULATION (BEZET): 441 MS
R AXIS: -39 DEGREES
RBC # BLD AUTO: 3.41 X10(6)UL (ref 3.8–5.8)
SODIUM SERPL-SCNC: 135 MMOL/L (ref 136–145)
SODIUM SERPL-SCNC: 143 MMOL/L (ref 136–145)
SODIUM SERPL-SCNC: 38 MMOL/L
T AXIS: 27 DEGREES
TROPONIN I SERPL-MCNC: <0.045 NG/ML (ref ?–0.04)
VENTRICULAR RATE: 135 BPM
WBC # BLD AUTO: 15.1 X10(3) UL (ref 4–11)

## 2020-07-15 PROCEDURE — 80048 BASIC METABOLIC PNL TOTAL CA: CPT | Performed by: INTERNAL MEDICINE

## 2020-07-15 PROCEDURE — 84300 ASSAY OF URINE SODIUM: CPT | Performed by: INTERNAL MEDICINE

## 2020-07-15 PROCEDURE — 99213 OFFICE O/P EST LOW 20 MIN: CPT

## 2020-07-15 PROCEDURE — 93010 ELECTROCARDIOGRAM REPORT: CPT | Performed by: INTERNAL MEDICINE

## 2020-07-15 PROCEDURE — 82962 GLUCOSE BLOOD TEST: CPT

## 2020-07-15 PROCEDURE — 93005 ELECTROCARDIOGRAM TRACING: CPT

## 2020-07-15 PROCEDURE — 84132 ASSAY OF SERUM POTASSIUM: CPT | Performed by: HOSPITALIST

## 2020-07-15 PROCEDURE — 84484 ASSAY OF TROPONIN QUANT: CPT | Performed by: HOSPITALIST

## 2020-07-15 PROCEDURE — 85025 COMPLETE CBC W/AUTO DIFF WBC: CPT | Performed by: UROLOGY

## 2020-07-15 PROCEDURE — 82330 ASSAY OF CALCIUM: CPT | Performed by: INTERNAL MEDICINE

## 2020-07-15 PROCEDURE — 80048 BASIC METABOLIC PNL TOTAL CA: CPT | Performed by: UROLOGY

## 2020-07-15 PROCEDURE — 82550 ASSAY OF CK (CPK): CPT | Performed by: INTERNAL MEDICINE

## 2020-07-15 PROCEDURE — 82570 ASSAY OF URINE CREATININE: CPT | Performed by: INTERNAL MEDICINE

## 2020-07-15 RX ORDER — KETOROLAC TROMETHAMINE 15 MG/ML
15 INJECTION, SOLUTION INTRAMUSCULAR; INTRAVENOUS EVERY 6 HOURS PRN
Status: DISCONTINUED | OUTPATIENT
Start: 2020-07-15 | End: 2020-07-16

## 2020-07-15 RX ORDER — DEXTROSE AND SODIUM CHLORIDE 5; .9 G/100ML; G/100ML
INJECTION, SOLUTION INTRAVENOUS CONTINUOUS
Status: DISCONTINUED | OUTPATIENT
Start: 2020-07-15 | End: 2020-07-15

## 2020-07-15 RX ORDER — NALOXONE HYDROCHLORIDE 0.4 MG/ML
0.08 INJECTION, SOLUTION INTRAMUSCULAR; INTRAVENOUS; SUBCUTANEOUS
Status: DISCONTINUED | OUTPATIENT
Start: 2020-07-15 | End: 2020-07-20

## 2020-07-15 RX ORDER — ONDANSETRON 2 MG/ML
4 INJECTION INTRAMUSCULAR; INTRAVENOUS EVERY 6 HOURS PRN
Status: DISCONTINUED | OUTPATIENT
Start: 2020-07-15 | End: 2020-07-15

## 2020-07-15 RX ORDER — DEXTROSE MONOHYDRATE 25 G/50ML
50 INJECTION, SOLUTION INTRAVENOUS ONCE
Status: COMPLETED | OUTPATIENT
Start: 2020-07-15 | End: 2020-07-15

## 2020-07-15 RX ORDER — HYDROMORPHONE HYDROCHLORIDE 1 MG/ML
1 INJECTION, SOLUTION INTRAMUSCULAR; INTRAVENOUS; SUBCUTANEOUS
Status: DISCONTINUED | OUTPATIENT
Start: 2020-07-15 | End: 2020-07-15

## 2020-07-15 RX ORDER — DIPHENHYDRAMINE HYDROCHLORIDE 50 MG/ML
12.5 INJECTION INTRAMUSCULAR; INTRAVENOUS EVERY 4 HOURS PRN
Status: DISCONTINUED | OUTPATIENT
Start: 2020-07-15 | End: 2020-07-20

## 2020-07-15 RX ORDER — HYDROMORPHONE HYDROCHLORIDE 1 MG/ML
0.5 INJECTION, SOLUTION INTRAMUSCULAR; INTRAVENOUS; SUBCUTANEOUS
Status: DISCONTINUED | OUTPATIENT
Start: 2020-07-15 | End: 2020-07-15

## 2020-07-15 RX ORDER — HYDROCODONE BITARTRATE AND ACETAMINOPHEN 5; 325 MG/1; MG/1
1 TABLET ORAL EVERY 4 HOURS PRN
Status: DISCONTINUED | OUTPATIENT
Start: 2020-07-15 | End: 2020-07-20

## 2020-07-15 RX ORDER — HYDROCODONE BITARTRATE AND ACETAMINOPHEN 5; 325 MG/1; MG/1
2 TABLET ORAL EVERY 4 HOURS PRN
Status: DISCONTINUED | OUTPATIENT
Start: 2020-07-15 | End: 2020-07-20

## 2020-07-15 NOTE — CONSULTS
[facility]  Report of Inpatient Ostomy Consultation     Dillon Lau Patient Status:  Inpatient    1945 MRN [id number]   [facility] 3NW-A 312/312-A Attending Jade Deluna MD   Hosp Day # 1 PCP Umer Cole MD       REASON FOR No      ASSESSMENT:    Stoma location: LLQ  Stoma type: colostomy   Stoma color: pink  Stoma condition: edematous  Stoma diameter:   Ostomy output: serosanguinous drainage  Stoma height: adequate  Peristomal skin:   Pouching system:one piece  Able to care

## 2020-07-15 NOTE — PROGRESS NOTES
PT IS A&O X 4. HE IS NPO W/SIP AND ICE CHIPS W/MEDS. HE HAS IV FLUIDS INFUSING. HAS SCHEDULED VANCO AND FLAGYL. HE HAD DILAUDID PCA PUMP. HE IS ON TELE RUNNING ST. HE HAS NG TUBE. HE HAS COLOSTOMY AND ILIEL CONDUIT. PT RECEIVED 1500 CC NS BOLUS.  HE HAD A C

## 2020-07-15 NOTE — PLAN OF CARE
Problem: Diabetes/Glucose Control  Goal: Glucose maintained within prescribed range  Description  INTERVENTIONS:  - Monitor Blood Glucose as ordered  - Assess for signs and symptoms of hyperglycemia and hypoglycemia  - Administer ordered medications to m to review patient's medication profile  Outcome: Progressing  Goal: Maintains adequate nutritional intake (undernourished)  Description  INTERVENTIONS:  - Monitor percentage of each meal consumed  - Identify factors contributing to decreased intake, treat REGARDING PT ACCUCHECK BS IS 46, GAVE PT 50% DEXTROSE.  CALLED BACK WANTS TO BE NOTIFIED AFTER WE DO RECHECK Aurora KILLIAN REGARDING PT BS RECHECK 104. AWAITING RESPONSE.    1946 PAGED DR. MOROCHO REGARDING PT BS 58. GAVE D50.  HEIDY

## 2020-07-15 NOTE — CONSULTS
[facility]    Report of Consultation    Abida Adams Patient Status:  Inpatient    1945 MRN [id number]   [facility] 3NW-A Attending Anai Jenkins MD   [address] Day # 1 PCP Ulices Ching MD       REASON FOR CONSULT:     Hyperkalemia 7/13/2016   • Type 2 diabetes mellitus without complication [facility] 30/05/1129   • Visual impairment     GLASSES     Past Surgical History:   Procedure Laterality Date   • APPENDECTOMY  1978   • CHOLECYSTECTOMY     • COLONOSCOPY      2015 by Francie Sender    • OT Subcutaneous, Q8H [address]  •  CefTRIAXone Sodium (ROCEPHIN) 1 g in sodium chloride 0.9% 100 mL MBP/ADD-vantage, 1 g, Intravenous, Q24H  •  metRONIDAZOLE in NaCl (FLAGYL) 5 mg/ml IVPB premix 500 mg, 500 mg, Intravenous, Q8H  •  glucose (DEX4) oral liquid 15 g, CREATSERUM 2.50 (H) 07/15/2020    ANIONGAP 7 07/15/2020    GFRNAA 24 (L) 07/15/2020    GFRAA 28 (L) 07/15/2020    CA 7.0 (L) 07/15/2020    OSMOCALC 303 (H) 07/15/2020    ALKPHO 95 07/07/2020    AST 19 07/07/2020    ALT 18 07/07/2020    BILT 0.54 07/07/2020 peaked t waves, QTc acceptable    ASSESSMENT/PLAN:     77 yo M with history of bladder cancer s/p cystectomy and ileal conduit 5/2015, urothelial ca, CKD 3 with baseline creatinine ~1.4 mg/dl, HTN, HL, DM presented for total urethrectomy c/b iatrogenic rec

## 2020-07-15 NOTE — PROGRESS NOTES
[facility] hospitalist daily note  Seen/examined on 7/15/20    S; notified after 7 am today that K 6.8  no chest pain, no SOB but does have pain in the surgery site  Repeat K ordered, EKG ordered, spoke with renal. IVF ordered, bicarb ordered, D10 and Insulin ord

## 2020-07-15 NOTE — OPERATIVE REPORT
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  Then a wedge incision was made on the glans with a 2 cm margin around the urethral meatus and through primarily sharp dissection the distal urethra was dermabond applied and urological portion of the procedure was concluded. All sponge, instrument and needle counts correct at the conclusion of the urological portion of the case. Patient then turned over to Dr. Karuna Nguyen for the remainder of the case.     PO

## 2020-07-15 NOTE — PROGRESS NOTES
[facility] Pharmacy Note:  Age Based Dose Adjustment    Olga Lidia Braswell has been prescribed ketorolac (TORADOL) 30 mg IV every 6 hours prn. Patient is >71 years old therefore the dose has been adjusted to 15 mg IV every 6 hours prn.     Thank you,  Erik Vee,

## 2020-07-15 NOTE — PROGRESS NOTES
[facility] Pharmacy Note: Antimicrobial Weight Based Dose Adjustment for: piperacillin/tazobactam (Natasha Estes)    Abida Adams is a 76year old patient who has been prescribed piperacillin/tazobactam (ZOSYN) 3.375 gm every 8 hours.     Estimated Creatinine Clearance: 2

## 2020-07-15 NOTE — HOME CARE LIAISON
[facility] accepted ptnt for [facility] on dc.  TNL will see ptnt at a later date per CM Fortino Curling    Thanks  Mikhail wagner

## 2020-07-15 NOTE — CONSULTS
[facility] Cardiology  Consultation Note      Arnaud Samsonanthony Patient Status:  Inpatient    1945 MRN [id number]   [facility] 3NW-A Attending Karli Valle MD   Hosp Day # 1 PCP Rosetta Daley MD     Outpatient cardiologist:  non complications and with modest correction of hyperK, QTc has resolved to 442msec. Cardiac wise, free of resting/exertional chest pain, shortness of breath, palpitations, LE edema, PND/orthopnea. Cardiology consultation was requested.     Medications:  HY CC  atorvastatin (LIPITOR) tab 40 mg, 40 mg, Oral, Nightly        Past Medical History:   Diagnosis Date   • Benign hypertensive heart disease, without heart failure    • Chronic fatigue 11/30/2017   • Class 1 obesity due to excess calories without serious myalgias  Neurological: negative for dizziness and headaches  Endocrine: negative for temperature intolerance      Physical Exam:  Blood pressure 101/65, pulse 116, temperature 98.4 °F (36.9 °C), temperature source Oral, resp.  rate 18, height 5' 11\" (1.80

## 2020-07-15 NOTE — CONSULTS
[facility] Pharmacy Note:  Pain Consult    Andrey Uriostegui is a 76year old patient started on Dilaudid PCA by Dr. James Greco. Pharmacy was consulted to review medication profile and to discontinue previously ordered narcotics and sedatives.     Medication profile was re

## 2020-07-15 NOTE — OPERATIVE REPORT
[address]    PATIENT'S NAME: Tung RIOJAS Grey   ATTENDING PHYSICIAN: Dennis Almaguer MD   OPERATING PHYSICIAN: Kevyn Rios M.D.    PATIENT ACCOUNT#:   [de-identified]    LOCATION:  [address] [place] [address]  MEDICAL RECORD #:   [id number]       DATE OF BIRTH:  [birth year].  The lower midline incision was reopened with a scalpel and dissection was carried down to the fascia. The fascia was incised and the abdomen was entered. There were dense adhesions present between the small bowel and the anterior abdominal wall.

## 2020-07-15 NOTE — PROGRESS NOTES
[facility]  Progress Note    Sara Putnam Patient Status:  Outpatient in a Bed    1945 MRN [id number]   [facility] 3NW-A Attending Shiva Laboy MD   Hosp Day # 0 PCP Mehrdad Cleveland MD     Subjective:  Patient's pain is poorly con

## 2020-07-15 NOTE — PROGRESS NOTES
[facility]  Urology Progress Note    Jenni Byers Patient Status:  Inpatient    1945 MRN [id number]   [facility] 3NW-A Attending Evelin Mendosa MD   [facility] Day # 1 PCP Mary Del Cid MD     Subjective:  Jenni Byers is a(n) 76 year o 12.5-->11.8-->10.7  Serum creat 1.68-->2.5  Good UOP    Plan:    Pain management - PCA pump ordered by general surgery  NG until tomorrow per surgery  Scrotal supporter  Nephrology and cardiology consultation appreciated  Follow labs    Dr. Babatunde Moore to see pa

## 2020-07-15 NOTE — PROGRESS NOTES
Pt is Ax4, afebrile, tachycardic, periodically hypotensive, received 2-1L 0.9% NaCl boluses, surgical/internal/urological teams have been made aware of pt's status.  Pt's Potassium level is critical, 6.8 (6.4 upon redraw), hospitalist notified and received

## 2020-07-16 LAB
ALBUMIN SERPL-MCNC: 2.1 G/DL (ref 3.4–5)
ANION GAP SERPL CALC-SCNC: 2 MMOL/L (ref 0–18)
ATRIAL RATE: 109 BPM
ATRIAL RATE: 118 BPM
ATRIAL RATE: 120 BPM
BASOPHILS # BLD: 0 X10(3) UL (ref 0–0.2)
BASOPHILS NFR BLD: 0 %
BUN BLD-MCNC: 37 MG/DL (ref 7–18)
BUN/CREAT SERPL: 20.6 (ref 10–20)
CALCIUM BLD-MCNC: 7.6 MG/DL (ref 8.5–10.1)
CHLORIDE SERPL-SCNC: 115 MMOL/L (ref 98–112)
CK SERPL-CCNC: 769 U/L (ref 39–308)
CO2 SERPL-SCNC: 25 MMOL/L (ref 21–32)
CREAT BLD-MCNC: 1.8 MG/DL (ref 0.7–1.3)
DEPRECATED RDW RBC AUTO: 44.5 FL (ref 35.1–46.3)
EOSINOPHIL # BLD: 0 X10(3) UL (ref 0–0.7)
EOSINOPHIL NFR BLD: 0 %
ERYTHROCYTE [DISTWIDTH] IN BLOOD BY AUTOMATED COUNT: 12.8 % (ref 11–15)
GLUCOSE BLD-MCNC: 120 MG/DL (ref 70–99)
GLUCOSE BLD-MCNC: 133 MG/DL (ref 70–99)
GLUCOSE BLD-MCNC: 167 MG/DL (ref 70–99)
GLUCOSE BLD-MCNC: 181 MG/DL (ref 70–99)
GLUCOSE BLD-MCNC: 191 MG/DL (ref 70–99)
GLUCOSE BLD-MCNC: 194 MG/DL (ref 70–99)
GLUCOSE BLD-MCNC: 211 MG/DL (ref 70–99)
GLUCOSE BLD-MCNC: 237 MG/DL (ref 70–99)
HCT VFR BLD AUTO: 24.4 % (ref 39–53)
HCT VFR BLD AUTO: 28 % (ref 39–53)
HGB BLD-MCNC: 8.1 G/DL (ref 13–17.5)
HGB BLD-MCNC: 9.1 G/DL (ref 13–17.5)
LYMPHOCYTES NFR BLD: 1.09 X10(3) UL (ref 1–4)
LYMPHOCYTES NFR BLD: 9 %
MCH RBC QN AUTO: 31.3 PG (ref 26–34)
MCHC RBC AUTO-ENTMCNC: 32.5 G/DL (ref 31–37)
MCV RBC AUTO: 96.2 FL (ref 80–100)
METAMYELOCYTES # BLD: 0.24 X10(3) UL
METAMYELOCYTES NFR BLD: 2 %
MONOCYTES # BLD: 0.85 X10(3) UL (ref 0.1–1)
MONOCYTES NFR BLD: 7 %
MORPHOLOGY: NORMAL
NEUTROPHILS # BLD AUTO: 10.2 X10 (3) UL (ref 1.5–7.7)
NEUTROPHILS NFR BLD: 78 %
NEUTS BAND NFR BLD: 4 %
NEUTS HYPERSEG # BLD: 9.92 X10(3) UL (ref 1.5–7.7)
OSMOLALITY SERPL CALC.SUM OF ELEC: 307 MOSM/KG (ref 275–295)
P AXIS: 25 DEGREES
P AXIS: 26 DEGREES
P AXIS: 4 DEGREES
P-R INTERVAL: 144 MS
P-R INTERVAL: 178 MS
P-R INTERVAL: 178 MS
PHOSPHATE SERPL-MCNC: 1.5 MG/DL (ref 2.5–4.9)
PLATELET # BLD AUTO: 135 10(3)UL (ref 150–450)
PLATELET MORPHOLOGY: NORMAL
POTASSIUM SERPL-SCNC: 4.8 MMOL/L (ref 3.5–5.1)
Q-T INTERVAL: 316 MS
Q-T INTERVAL: 348 MS
Q-T INTERVAL: 436 MS
QRS DURATION: 84 MS
QRS DURATION: 86 MS
QRS DURATION: 90 MS
QTC CALCULATION (BEZET): 442 MS
QTC CALCULATION (BEZET): 468 MS
QTC CALCULATION (BEZET): 616 MS
R AXIS: -27 DEGREES
R AXIS: -33 DEGREES
R AXIS: -34 DEGREES
RBC # BLD AUTO: 2.91 X10(6)UL (ref 3.8–5.8)
SODIUM SERPL-SCNC: 142 MMOL/L (ref 136–145)
T AXIS: 111 DEGREES
T AXIS: 22 DEGREES
T AXIS: 32 DEGREES
TOTAL CELLS COUNTED: 100
VENTRICULAR RATE: 109 BPM
VENTRICULAR RATE: 118 BPM
VENTRICULAR RATE: 120 BPM
WBC # BLD AUTO: 12.1 X10(3) UL (ref 4–11)

## 2020-07-16 PROCEDURE — 85027 COMPLETE CBC AUTOMATED: CPT | Performed by: HOSPITALIST

## 2020-07-16 PROCEDURE — 82962 GLUCOSE BLOOD TEST: CPT

## 2020-07-16 PROCEDURE — C9113 INJ PANTOPRAZOLE SODIUM, VIA: HCPCS | Performed by: INTERNAL MEDICINE

## 2020-07-16 PROCEDURE — 85018 HEMOGLOBIN: CPT | Performed by: PHYSICIAN ASSISTANT

## 2020-07-16 PROCEDURE — 82550 ASSAY OF CK (CPK): CPT | Performed by: INTERNAL MEDICINE

## 2020-07-16 PROCEDURE — 85025 COMPLETE CBC W/AUTO DIFF WBC: CPT | Performed by: HOSPITALIST

## 2020-07-16 PROCEDURE — 97161 PT EVAL LOW COMPLEX 20 MIN: CPT

## 2020-07-16 PROCEDURE — 80069 RENAL FUNCTION PANEL: CPT | Performed by: INTERNAL MEDICINE

## 2020-07-16 PROCEDURE — 97530 THERAPEUTIC ACTIVITIES: CPT

## 2020-07-16 PROCEDURE — 99213 OFFICE O/P EST LOW 20 MIN: CPT

## 2020-07-16 PROCEDURE — 85014 HEMATOCRIT: CPT | Performed by: PHYSICIAN ASSISTANT

## 2020-07-16 PROCEDURE — 85007 BL SMEAR W/DIFF WBC COUNT: CPT | Performed by: HOSPITALIST

## 2020-07-16 RX ORDER — SODIUM CHLORIDE 9 MG/ML
INJECTION, SOLUTION INTRAVENOUS CONTINUOUS
Status: DISCONTINUED | OUTPATIENT
Start: 2020-07-16 | End: 2020-07-17

## 2020-07-16 RX ORDER — DEXTROSE AND SODIUM CHLORIDE 5; .9 G/100ML; G/100ML
INJECTION, SOLUTION INTRAVENOUS CONTINUOUS
Status: DISCONTINUED | OUTPATIENT
Start: 2020-07-16 | End: 2020-07-16

## 2020-07-16 RX ORDER — DILTIAZEM HYDROCHLORIDE 5 MG/ML
INJECTION INTRAVENOUS
Status: COMPLETED
Start: 2020-07-16 | End: 2020-07-16

## 2020-07-16 RX ORDER — KETOROLAC TROMETHAMINE 15 MG/ML
15 INJECTION, SOLUTION INTRAMUSCULAR; INTRAVENOUS EVERY 6 HOURS
Status: DISCONTINUED | OUTPATIENT
Start: 2020-07-16 | End: 2020-07-16

## 2020-07-16 NOTE — HOME CARE LIAISON
MET WITH PTNT AND OFFERED CHOICE  OF AGENCIES. PTNT AGREEABLE TO [facility]. MET WITH PTNT TO DISCUSS HOME HEALTH SERVICES AND COVERAGE CRITERIA. PTNT AGREEABLE TO Mike Wu. PTNT GIVEN RESIDENTIAL BROCHURE.  RESIDENTIAL WITH PROVIDE SN ON DISCHAR

## 2020-07-16 NOTE — PROGRESS NOTES
[facility]  Progress Note    Syed Marking Patient Status:  Inpatient    1945 MRN [id number]   [facility] 3NW-A Attending Parish Marcos MD   [address] Day # 1 PCP Zeny Thurman MD     Subjective:    Patient reports PCA helped with abdo Gross hematuria     Urethral bleeding     Cancer of urethral stump (HCC)     Pain in left testicle     Urothelial carcinoma (HCC)     Rectal laceration      Impression:     75 y/o S/P: repair of rectal laceration, ex lap, AUGUSTUS, colostomy  Appear non toxic

## 2020-07-16 NOTE — CONSULTS
[facility]  Endocrinology Consultation    Billy Neff Patient Status:  Inpatient    1945 MRN [id number]   [facility] 3NW-A Attending Sondra Kawasaki, MD   [address] Day # 1 PCP Kathleen Vargas MD       Reason for Consultation:  DM2 manage COLONOSCOPY      2015 by     • OTHER SURGICAL HISTORY      Ilieal Conduit   • [address] [facility] BLADDER,ILEAL CONDUIT     • UMBILICAL HERNIA REPAIR  2007   • URETHROPLASTY WITH BUCCAL MUCOSA GRAFT N/A 7/14/2020    Performed by Betzaida Felix MD at [address] [place] [address] **OR** Ondansetron HCl (ZOFRAN) tab 4 mg, 4 mg, Oral, Q6H PRN  •  benzocaine-menthol (CEPACOL (SUGAR-FREE)) 1 lozenge, 1 lozenge, Buccal, Q15 Min PRN  •  Heparin Sodium (Porcine) 5000 UNIT/ML injection 5,000 Units, 5,000 Units, Subcutaneous, Q8H [facility]  •  me 28.0 07/16/2020    .0 07/16/2020    CREATSERUM 1.80 07/16/2020    BUN 37 07/16/2020     07/16/2020    K 4.8 07/16/2020     07/16/2020    CO2 25.0 07/16/2020     07/16/2020    CA 7.6 07/16/2020    ALB 2.1 07/16/2020    PHOS 1.5 07/

## 2020-07-16 NOTE — PROGRESS NOTES
[facility] Cardiology  Progress Note    Wolm Check Patient Status:  Inpatient    1945 MRN [id number]   [facility] 3NW-A Attending Dariela Anaya MD   Hosp Day # 1 PCP Nalini Pablo MD     Outpatient cardiologist:  non estab (107.7 kg)  07/07/20 : 239 lb (108.4 kg)  05/12/20 : 233 lb (105.7 kg)      General: Awake and alert; in no acute distress  Cardiac: Regular rate and regular rhythm; no murmurs/rubs/gallops are appreciated  Lungs: Clear to auscultation bilaterally; no acce Q15 Min PRN    Or  glucose (DEX4) oral liquid 30 g, 30 g, Oral, Q15 Min PRN    Or  Glucose-Vitamin C (DEX-4) chewable tab 8 tablet, 8 tablet, Oral, Q15 Min PRN  atorvastatin (LIPITOR) tab 40 mg, 40 mg, Oral, Nightly        Laboratory Data:  Lab Results   C

## 2020-07-16 NOTE — PHYSICAL THERAPY NOTE
PHYSICAL THERAPY EVALUATION - INPATIENT     Room Number: 610/421-D  Evaluation Date: 7/16/2020  Type of Evaluation: Initial  Physician Order: PT Eval and Treat    Presenting Problem: s/p uretectomy with rectal tear;s/p repair of rectal tear with colo 1  Railing: No  Stairs to Bedroom: 15  Railing: Yes    Lives With: Significant other  Drives: Yes  Patient Owned Equipment: None       Prior Level of [place]: per pt, he was ind with all functional mobility and ADL.     SUBJECTIVE  \"I can't believe ho Score (AM-PAC Scale): 28.58   CMS Modifier (G-Code): CM    FUNCTIONAL ABILITY STATUS  Gait Assessment   Gait Assistance: Not tested           Stoop/Curb Assistance: Not tested       Skilled Therapy Provided: evaluation; pt instructed in the role of PT, POC training  Rehab Potential : Good  Frequency (Obs): 5x/week  Number of Visits to Meet Established Goals: 5      CURRENT GOALS    Goal #1 Patient is able to demonstrate supine - sit EOB @ level: modified independent     Goal #2 Patient is able to demonstrate

## 2020-07-16 NOTE — PROGRESS NOTES
[facility]  Urology Progress Note    Celsa Ramirez Patient Status:  Inpatient    1945 MRN [id number]   [facility] 3NW-A Attending Annie Camacho MD   [address] Day # 1 PCP Jada Dominguez MD     Subjective:  Celsa Ramirez is a(n) 76 year o Monitor UOP - continue IVF. Trend creatinine  Encouraged spirometry. Monitor perineal incision.       Paz Osborne MD  [facility] [facility] [id number] [facility]  Department of Urology  Office: [phone]

## 2020-07-16 NOTE — PROGRESS NOTES
Pt is Ax4, vss, afebrile, /Tele-ST, NPP w/ sips, IVF & abx infusing, Accu-check Q2H ordered d/t consistent hypoglycemia, once BS was stable requested order be switched back to Q6H but Dr. Tra Yu defered to Dr. Kimberley Julian.  Pt's pain is much improved d/t [place]

## 2020-07-16 NOTE — PROGRESS NOTES
[facility]    Nephrology Progress Note    Billy Neff Attending:  Sondra Kawasaki, MD       SUBJECTIVE:     Complaining of lower abd pain  Increasing UOP noted  No cp, sob, LH  K normalized with medical management    PHYSICAL EXAM:     Vital Signs: BP 1 96.2 07/16/2020    MCH 31.3 07/16/2020    MCHC 32.5 07/16/2020    RDW 12.8 07/16/2020    NEPRELIM 10.20 (H) 07/16/2020    NEUTABS 9.92 (H) 07/16/2020    LYMPHABS 1.09 07/16/2020    EOSABS 0.00 07/16/2020    BASABS 0.00 07/16/2020    NEUT 78 07/16/2020    L (ZOSYN) 4.5 g in dextrose 5 % 100 mL MBP/ADD-vantage, 4.5 g, Intravenous, Q8H  lactated ringers infusion, , Intravenous, Continuous  acetaminophen (TYLENOL EXTRA STRENGTH) tab 1,000 mg, 1,000 mg, Oral, Once  acetaminophen (TYLENOL EXTRA STRENGTH) tab 1,000 improved     Hypocalcemia:  -- corrected to normal with serum albumin and ionized calcium wnl     S/p total urethrectomy:  -- per urology    Will follow. D/w patient's wife at bedside.     Thank you for allowing me to participate in the care of this patien

## 2020-07-16 NOTE — PROGRESS NOTES
[facility]  Urology Progress Note    Shivani Reza Patient Status:  Inpatient    1945 MRN [id number]   [facility] 3NW-A Attending Clayton Giron MD   [facility] Day # 1 PCP Yeimi Mcmahan MD     Subjective:  Shivani Reza is a(n) 76 year o after 6 hours if less than 150cc can remove and start clears  Pain management  CPM with abx  Scrotal supporter  Repeat H/H this afternoon  Follow labs    [facility]  7/16/2020  9:12 AM    Addendum:  Repeat Hgb 8.1 (was 9.1 this AM).     Reviewed above wi

## 2020-07-16 NOTE — WOUND PROGRESS NOTE
[facility]  Report of Inpatient Ostomy Progress     Adalid Zaidi Patient Status:  Inpatient    1945 MRN [id number]   [facility] 3NW-A 312/312-A Attending Davidson Fonseca MD   Hosp Day # 1 PCP Karie Oliva MD       SUBJECTIVE or less      Drinks per session: 1 or 2      Binge frequency: Never    Drug use: No        ASSESSMENT:    Stoma location: Q  Stoma type: colostomy  Stoma color: pink  Stoma condition: edematous  Stoma diameter: oval shape height 1 1/2\", width 1 5/8\"  O

## 2020-07-16 NOTE — CM/SW NOTE
07/16/20 1200   CM/SW Referral Data   Referral Source    Reason for Referral Discharge planning   Informant Patient;Spouse   Pertinent Medical Hx   Primary Care Physician Name Silvia Camarena   Patient Info   Patient's Mental Status Alert;Ria

## 2020-07-16 NOTE — PROGRESS NOTES
[facility] hospitalist daily note  Seen/examined on 7/16/20    S;  Pt seen and examined. No longer hypoglycemic,  this AM.  States pain improved with toradol. No F/C. NGT remains in place.         Medications in Epic    PE    07/16/20  1229   BP: 115/61

## 2020-07-17 ENCOUNTER — APPOINTMENT (OUTPATIENT)
Dept: CV DIAGNOSTICS | Facility: HOSPITAL | Age: 75
DRG: 674 | End: 2020-07-17
Attending: INTERNAL MEDICINE
Payer: MEDICARE

## 2020-07-17 LAB
ALBUMIN SERPL-MCNC: 2.1 G/DL (ref 3.4–5)
ANION GAP SERPL CALC-SCNC: 1 MMOL/L (ref 0–18)
BASOPHILS # BLD: 0 X10(3) UL (ref 0–0.2)
BASOPHILS NFR BLD: 0 %
BUN BLD-MCNC: 33 MG/DL (ref 7–18)
BUN/CREAT SERPL: 22 (ref 10–20)
CALCIUM BLD-MCNC: 7.5 MG/DL (ref 8.5–10.1)
CHLORIDE SERPL-SCNC: 117 MMOL/L (ref 98–112)
CO2 SERPL-SCNC: 25 MMOL/L (ref 21–32)
CREAT BLD-MCNC: 1.5 MG/DL (ref 0.7–1.3)
DEPRECATED RDW RBC AUTO: 45.2 FL (ref 35.1–46.3)
EOSINOPHIL # BLD: 0.1 X10(3) UL (ref 0–0.7)
EOSINOPHIL NFR BLD: 1 %
ERYTHROCYTE [DISTWIDTH] IN BLOOD BY AUTOMATED COUNT: 12.9 % (ref 11–15)
GLUCOSE BLD-MCNC: 181 MG/DL (ref 70–99)
GLUCOSE BLD-MCNC: 195 MG/DL (ref 70–99)
GLUCOSE BLD-MCNC: 207 MG/DL (ref 70–99)
GLUCOSE BLD-MCNC: 229 MG/DL (ref 70–99)
GLUCOSE BLD-MCNC: 262 MG/DL (ref 70–99)
HCT VFR BLD AUTO: 25.9 % (ref 39–53)
HGB BLD-MCNC: 8.2 G/DL (ref 13–17.5)
LYMPHOCYTES NFR BLD: 0.69 X10(3) UL (ref 1–4)
LYMPHOCYTES NFR BLD: 7 %
MCH RBC QN AUTO: 30.8 PG (ref 26–34)
MCHC RBC AUTO-ENTMCNC: 31.7 G/DL (ref 31–37)
MCV RBC AUTO: 97.4 FL (ref 80–100)
METAMYELOCYTES # BLD: 0.1 X10(3) UL
METAMYELOCYTES NFR BLD: 1 %
MONOCYTES # BLD: 0.2 X10(3) UL (ref 0.1–1)
MONOCYTES NFR BLD: 2 %
NEUTROPHILS # BLD AUTO: 8.2 X10 (3) UL (ref 1.5–7.7)
NEUTROPHILS NFR BLD: 83 %
NEUTS BAND NFR BLD: 6 %
NEUTS HYPERSEG # BLD: 8.81 X10(3) UL (ref 1.5–7.7)
OSMOLALITY SERPL CALC.SUM OF ELEC: 308 MOSM/KG (ref 275–295)
PHOSPHATE SERPL-MCNC: 1.8 MG/DL (ref 2.5–4.9)
PLATELET # BLD AUTO: 138 10(3)UL (ref 150–450)
POTASSIUM SERPL-SCNC: 3.8 MMOL/L (ref 3.5–5.1)
RBC # BLD AUTO: 2.66 X10(6)UL (ref 3.8–5.8)
SODIUM SERPL-SCNC: 143 MMOL/L (ref 136–145)
TOTAL CELLS COUNTED: 100
WBC # BLD AUTO: 9.9 X10(3) UL (ref 4–11)

## 2020-07-17 PROCEDURE — 85007 BL SMEAR W/DIFF WBC COUNT: CPT | Performed by: PHYSICIAN ASSISTANT

## 2020-07-17 PROCEDURE — 93306 TTE W/DOPPLER COMPLETE: CPT | Performed by: INTERNAL MEDICINE

## 2020-07-17 PROCEDURE — 85025 COMPLETE CBC W/AUTO DIFF WBC: CPT | Performed by: PHYSICIAN ASSISTANT

## 2020-07-17 PROCEDURE — 85027 COMPLETE CBC AUTOMATED: CPT | Performed by: PHYSICIAN ASSISTANT

## 2020-07-17 PROCEDURE — 80069 RENAL FUNCTION PANEL: CPT | Performed by: INTERNAL MEDICINE

## 2020-07-17 PROCEDURE — 82962 GLUCOSE BLOOD TEST: CPT

## 2020-07-17 NOTE — PROGRESS NOTES
[facility]  Progress Note    Deandar Meals Patient Status:  Inpatient    1945 MRN [id number]   [facility] 3NW-A Attending Rosalie Guzmán MD   [facility] Day # 2 PCP Kevon Pablo MD     Subjective:  Feeling better. Large bowel movement.

## 2020-07-17 NOTE — PROGRESS NOTES
[facility]    Nephrology Progress Note    Molina Rivera Attending:  Olman Muniz MD       SUBJECTIVE:     NGT out, passing gas, tolerating clear liquids but still low intake  Chart reviewed; had Afib with RVR overnight and started dilt gtt    PHYSICAL 07/17/2020    NEUTABS 8.81 (H) 07/17/2020    LYMPHABS 0.69 (L) 07/17/2020    EOSABS 0.10 07/17/2020    BASABS 0.00 07/17/2020    NEUT 83 07/17/2020    LYMPH 7 07/17/2020    MON 2 07/17/2020    EOS 1 07/17/2020    BASO 0 07/17/2020    NEPERCENT 83.8 07/15/2 Sod-Tazobactam So (ZOSYN) 4.5 g in dextrose 5 % 100 mL MBP/ADD-vantage, 4.5 g, Intravenous, Q8H  lactated ringers infusion, , Intravenous, Continuous  acetaminophen (TYLENOL EXTRA STRENGTH) tab 1,000 mg, 1,000 mg, Oral, Once  acetaminophen (TYLENOL EXTRA S ionized calcium wnl     S/p total urethrectomy:  -- per urology    Afib:  -- per cardiology    Thank you for allowing me to participate in the care of this patient. Please do not hesitate to call with questions or concerns.         MD Tamiko Marte

## 2020-07-17 NOTE — PROGRESS NOTES
No residual gastric fluid obtained from nasogastric tube post 6 hour clamping trial, patient denies nausea. Nasogastric tube removed per MD order and patient started on clear liquid diet.

## 2020-07-17 NOTE — PROGRESS NOTES
Pt Ax4, afebrile, /Tele-NSR, developed A fib w/ RVR at 2040, paged cards physician on call, Dr. Lulú Miguel ordered Cardizem drip 10mg/hr w/ 10mg bolus, at 2147 pt converted back to NSR/ST on his own.  Dr. Lulú Miguel was informed on this development and decre

## 2020-07-17 NOTE — PROGRESS NOTES
Aquacel dressing covering abdominal incision site removed per order from [place], [place].  Incision site cleansed with CHG wipes, scant serosanguineous drainage noted at the bottom of the incision site post dressing removal.

## 2020-07-17 NOTE — PLAN OF CARE
Patient is alert and oriented x3  Up with SBA to chair. Patient did not want to walk today. He is agreeable to walk x1 before bed.    Urostomy bag changed; stoma and surrounding skin look WNL; no redness or irritation Patient also was able to look and confi antiemetic medications  - Provide nonpharmacologic comfort measures as appropriate  - Advance diet as tolerated, if ordered  - Obtain nutritional consult as needed  - Evaluate fluid balance  Outcome: Progressing  Goal: Maintains or returns to baseline angely Achieve highest/safest level of mobility/gait  Description  Interventions:  - Assess patient's functional ability and stability  - Promote increasing activity/tolerance for mobility and gait  - Educate and engage patient/family in tolerated activity level

## 2020-07-17 NOTE — PROGRESS NOTES
[facility]  Urology Progress Note    Zenia Caban Patient Status:  Inpatient    1945 MRN [id number]   [facility] 3NW-A Attending Eduardo Salomon MD   [facility] Day # 2 PCP Devika Stover MD     Subjective:  Zenia Caban is a(n) 76 year o IS x 10/hr  Diet per general surgery  Pain management  CPM with abx  Scrotal supporter  Follow labs  Nursing staff to change urostomy appliance/bag, assess for any surrounding skin redness/etc-->notify urology if present.     Above discussed with nurse,

## 2020-07-17 NOTE — PROGRESS NOTES
[facility] hospitalist daily note  Seen/examined on 7/17/20    S;  Pt seen and examined. He went into a-fib with RVR overnight, started on dilt gtt with conversion back to NSR. NGT removed, pt tolerating clears. This AM he c/o pain around R stoma. No F/C.

## 2020-07-17 NOTE — PROGRESS NOTES
[facility] Cardiology  Progress Note    Olga Lidia Braswell Patient Status:  Inpatient    1945 MRN [id number]   [facility] 3NW-A Attending Josefina Nyhan, MD   Hosp Day # 2 PCP Noble Sewell MD     Outpatient cardiologist:  non estab 7/17/2020 0739  Gross per 24 hour   Intake 9.8 ml   Output 1940 ml   Net -1930.2 ml     Wt Readings from Last 3 Encounters:  07/14/20 : 237 lb 7 oz (107.7 kg)  07/07/20 : 239 lb (108.4 kg)  05/12/20 : 233 lb (105.7 kg)      General: Awake and alert; in no 5,000 Units, 5,000 Units, Subcutaneous, Q8H [address]  glucose (DEX4) oral liquid 15 g, 15 g, Oral, Q15 Min PRN    Or  Glucose-Vitamin C (DEX-4) chewable tab 4 tablet, 4 tablet, Oral, Q15 Min PRN    Or  dextrose 50 % injection 50 mL, 50 mL, Intravenous, Q15 Min

## 2020-07-18 LAB
ALBUMIN SERPL-MCNC: 2 G/DL (ref 3.4–5)
ANION GAP SERPL CALC-SCNC: 5 MMOL/L (ref 0–18)
BASOPHILS # BLD AUTO: 0.02 X10(3) UL (ref 0–0.2)
BASOPHILS NFR BLD AUTO: 0.3 %
BUN BLD-MCNC: 24 MG/DL (ref 7–18)
BUN/CREAT SERPL: 19.8 (ref 10–20)
CALCIUM BLD-MCNC: 7.6 MG/DL (ref 8.5–10.1)
CHLORIDE SERPL-SCNC: 116 MMOL/L (ref 98–112)
CO2 SERPL-SCNC: 23 MMOL/L (ref 21–32)
CREAT BLD-MCNC: 1.21 MG/DL (ref 0.7–1.3)
DEPRECATED RDW RBC AUTO: 43.8 FL (ref 35.1–46.3)
EOSINOPHIL # BLD AUTO: 0.2 X10(3) UL (ref 0–0.7)
EOSINOPHIL NFR BLD AUTO: 2.6 %
ERYTHROCYTE [DISTWIDTH] IN BLOOD BY AUTOMATED COUNT: 12.7 % (ref 11–15)
GLUCOSE BLD-MCNC: 200 MG/DL (ref 70–99)
GLUCOSE BLD-MCNC: 215 MG/DL (ref 70–99)
GLUCOSE BLD-MCNC: 236 MG/DL (ref 70–99)
GLUCOSE BLD-MCNC: 247 MG/DL (ref 70–99)
GLUCOSE BLD-MCNC: 253 MG/DL (ref 70–99)
HCT VFR BLD AUTO: 23.3 % (ref 39–53)
HCT VFR BLD AUTO: 24 % (ref 39–53)
HGB BLD-MCNC: 7.8 G/DL (ref 13–17.5)
HGB BLD-MCNC: 8 G/DL (ref 13–17.5)
IMM GRANULOCYTES # BLD AUTO: 0.07 X10(3) UL (ref 0–1)
IMM GRANULOCYTES NFR BLD: 0.9 %
LYMPHOCYTES # BLD AUTO: 0.96 X10(3) UL (ref 1–4)
LYMPHOCYTES NFR BLD AUTO: 12.5 %
MCH RBC QN AUTO: 31.6 PG (ref 26–34)
MCHC RBC AUTO-ENTMCNC: 33.5 G/DL (ref 31–37)
MCV RBC AUTO: 94.3 FL (ref 80–100)
MONOCYTES # BLD AUTO: 0.56 X10(3) UL (ref 0.1–1)
MONOCYTES NFR BLD AUTO: 7.3 %
NEUTROPHILS # BLD AUTO: 5.9 X10 (3) UL (ref 1.5–7.7)
NEUTROPHILS # BLD AUTO: 5.9 X10(3) UL (ref 1.5–7.7)
NEUTROPHILS NFR BLD AUTO: 76.4 %
OSMOLALITY SERPL CALC.SUM OF ELEC: 308 MOSM/KG (ref 275–295)
PHOSPHATE SERPL-MCNC: 1.4 MG/DL (ref 2.5–4.9)
PLATELET # BLD AUTO: 162 10(3)UL (ref 150–450)
POTASSIUM SERPL-SCNC: 3.3 MMOL/L (ref 3.5–5.1)
RBC # BLD AUTO: 2.47 X10(6)UL (ref 3.8–5.8)
SODIUM SERPL-SCNC: 144 MMOL/L (ref 136–145)
WBC # BLD AUTO: 7.7 X10(3) UL (ref 4–11)

## 2020-07-18 PROCEDURE — 82962 GLUCOSE BLOOD TEST: CPT

## 2020-07-18 PROCEDURE — 80069 RENAL FUNCTION PANEL: CPT | Performed by: INTERNAL MEDICINE

## 2020-07-18 PROCEDURE — 85014 HEMATOCRIT: CPT | Performed by: PHYSICIAN ASSISTANT

## 2020-07-18 PROCEDURE — 85018 HEMOGLOBIN: CPT | Performed by: PHYSICIAN ASSISTANT

## 2020-07-18 PROCEDURE — 85025 COMPLETE CBC W/AUTO DIFF WBC: CPT | Performed by: INTERNAL MEDICINE

## 2020-07-18 RX ORDER — OXYCODONE HYDROCHLORIDE 10 MG/1
10 TABLET ORAL EVERY 4 HOURS PRN
Status: DISCONTINUED | OUTPATIENT
Start: 2020-07-18 | End: 2020-07-20

## 2020-07-18 RX ORDER — OXYCODONE HYDROCHLORIDE AND ACETAMINOPHEN 5; 325 MG/1; MG/1
1 TABLET ORAL EVERY 4 HOURS PRN
Status: DISCONTINUED | OUTPATIENT
Start: 2020-07-18 | End: 2020-07-18

## 2020-07-18 RX ORDER — POTASSIUM CHLORIDE 20 MEQ/1
40 TABLET, EXTENDED RELEASE ORAL ONCE
Status: COMPLETED | OUTPATIENT
Start: 2020-07-18 | End: 2020-07-18

## 2020-07-18 RX ORDER — OXYCODONE HYDROCHLORIDE 5 MG/1
5 TABLET ORAL EVERY 4 HOURS PRN
Status: DISCONTINUED | OUTPATIENT
Start: 2020-07-18 | End: 2020-07-20

## 2020-07-18 RX ORDER — METOPROLOL SUCCINATE 25 MG/1
25 TABLET, EXTENDED RELEASE ORAL
Status: DISCONTINUED | OUTPATIENT
Start: 2020-07-18 | End: 2020-07-20

## 2020-07-18 NOTE — PROGRESS NOTES
[facility]  Endocrinology Progress Note    Molina Rivera Patient Status:  Inpatient    1945 MRN [id number]   [facility] 3NW-A Attending Olman Muniz MD   [address] Day # 3 PCP Shannan Saleem MD     Subjective:  Remains hyperglycemic to 104* 58* 119* 94 120* 133* 167* 191* 237* 211* 194* 207* 262*     Component      Latest Ref Rng & Units 7/7/2020   HEMOGLOBIN A1C      4.0 - 5.6 % 7.9 (H)     Assessment/Plan:  1.  Uncontrolled T2DM with CKD3 and hyperglycemia  - A1c 7.9% on glimepiride 4 m

## 2020-07-18 NOTE — PROGRESS NOTES
[facility]    Nephrology Progress Note    Tk Jonas Attending:  Betzaida Felix MD       SUBJECTIVE:     Some abdominal soreness otherwise no complaints    PHYSICAL EXAM:     Vital Signs: /70 (BP Location: Left arm)   Pulse 74   Temp 97.4 °F (3 BASABS 0.00 07/17/2020    NEUT 83 07/17/2020    LYMPH 7 07/17/2020    MON 2 07/17/2020    EOS 1 07/17/2020    BASO 0 07/17/2020    NEPERCENT 76.4 07/18/2020    LYPERCENT 12.5 07/18/2020    MOPERCENT 7.3 07/18/2020    EOPERCENT 2.6 07/18/2020    BAPERCENT 0 Intravenous, Q4H PRN  lactated ringers infusion, , Intravenous, Continuous  acetaminophen (TYLENOL EXTRA STRENGTH) tab 1,000 mg, 1,000 mg, Oral, Once  acetaminophen (TYLENOL EXTRA STRENGTH) tab 1,000 mg, 1,000 mg, Oral, TID  bisacodyl (DULCOLAX) rectal sup allowing me to participate in the care of this patient. Please do not hesitate to call with questions or concerns.         Kamryn Guerrero MD  [address] Nephrology  [address] [place] [address]  [address]    7/18/2020  10:03 AM

## 2020-07-18 NOTE — PLAN OF CARE
Patient is alert and oriented x3  Up with SBA in hallway  Up to chair today  I/C with urine present  Colostomy present with semi formed stool, gas, and serosang drainage.  Bag leaking today; changed with patient watching+ rectal BMS  Incisions to ABD are CD nonpharmacologic comfort measures as appropriate  - Advance diet as tolerated, if ordered  - Obtain nutritional consult as needed  - Evaluate fluid balance  Outcome: Progressing  Goal: Maintains or returns to baseline bowel function  Description  INTERVENT mobility/gait  Description  Interventions:  - Assess patient's functional ability and stability  - Promote increasing activity/tolerance for mobility and gait  - Educate and engage patient/family in tolerated activity level and precautions  - Recommendatio

## 2020-07-18 NOTE — PROGRESS NOTES
[facility] hospitalist daily note  Seen/examined on 7/18/20    S;  Pt seen and examined. Sitting in chair. No cp/palpitations/LH/dizziness eating a little. Plan to walk. Pain ok. +flatus.      Medications in Epic    PE    07/18/20  1150   BP:    Pulse: 70   Resp: ordered    Plan of care d/w pt and wife who agree.   Reviewed chart including previous progress notes    Chente Warner MD  [facility] Hospitalist  [phone]  7/18/2020  3:36 PM

## 2020-07-18 NOTE — PROGRESS NOTES
[facility] [address] [place] [address] Cardiology  Progress Note    Shivani Reza Patient Status:  Inpatient    1945 MRN [id number]   [facility] 3NW-A Attending Clayton Giron MD   Hosp Day # 3 PCP Yeimi Mcmahan MD           Impression:  1. AF, paroxysma appreciated  Lungs: Clear to auscultation bilaterally; no accessory muscle use  Abdomen: Soft, non-tender; bowel sounds are normoactive  Extremities: No clubbing/cyanosis; moves all 4 extremities normally    insulin detemir (LEVEMIR) 100 UNIT/ML flextouch Q15 Min PRN    Or  glucose (DEX4) oral liquid 30 g, 30 g, Oral, Q15 Min PRN    Or  Glucose-Vitamin C (DEX-4) chewable tab 8 tablet, 8 tablet, Oral, Q15 Min PRN  atorvastatin (LIPITOR) tab 40 mg, 40 mg, Oral, Nightly        Laboratory Data:  Lab Results   C

## 2020-07-18 NOTE — PROGRESS NOTES
[facility]  Progress Note    George Hercules Patient Status:  Inpatient    1945 MRN [id number]   [facility] 3NW-A Attending Wei Lomas MD   [facility] Day # 3 PCP Moises Frances MD     Subjective:  Patient feeling well.  Tolerating full

## 2020-07-18 NOTE — PLAN OF CARE
A&Ox4. VSS. RA. . Tele-NSR. Abdomen rounded, distended. Denies nausea. Passing gas. Tolerating diet. Ileal conduit intact draining clear yellow urine. 2 JUAN JOSÉ drains with dressings that are c/d/I draining serosanguinous fluid.  Colostomy has serosanguinous Evaluate effectiveness of ordered antiemetic medications  - Provide nonpharmacologic comfort measures as appropriate  - Advance diet as tolerated, if ordered  - Obtain nutritional consult as needed  - Evaluate fluid balance  Outcome: Progressing  Goal: Carlos Falcon Impaired Functional Mobility  Goal: Achieve highest/safest level of mobility/gait  Description  Interventions:  - Assess patient's functional ability and stability  - Promote increasing activity/tolerance for mobility and gait  - Educate and engage patient

## 2020-07-18 NOTE — PROGRESS NOTES
[facility]    Progress Note    Trevin Denney Patient Status:  Inpatient    1945 MRN [id number]   [facility] 3NW-A Attending Frances Miranda MD   [facility] Day # 3 PCP Kelle Rodrigues MD        Subjective:   Trevin Denney is a(n) 76year old 03/26/2020    TSH 1.246 03/26/2020    PSA 1.3 08/19/2014    BNP 26 04/04/2019    PHOS 1.4 (L) 07/18/2020    TROP <0.045 07/15/2020     (H) 07/16/2020                      Adina Allred PA-C  7/18/2020      ---      Agree with excellent note and

## 2020-07-19 LAB
ALBUMIN SERPL-MCNC: 2.1 G/DL (ref 3.4–5)
ANION GAP SERPL CALC-SCNC: 2 MMOL/L (ref 0–18)
BUN BLD-MCNC: 22 MG/DL (ref 7–18)
BUN/CREAT SERPL: 18.8 (ref 10–20)
CALCIUM BLD-MCNC: 8 MG/DL (ref 8.5–10.1)
CHLORIDE SERPL-SCNC: 116 MMOL/L (ref 98–112)
CO2 SERPL-SCNC: 26 MMOL/L (ref 21–32)
CREAT BLD-MCNC: 1.17 MG/DL (ref 0.7–1.3)
GLUCOSE BLD-MCNC: 164 MG/DL (ref 70–99)
GLUCOSE BLD-MCNC: 168 MG/DL (ref 70–99)
GLUCOSE BLD-MCNC: 184 MG/DL (ref 70–99)
GLUCOSE BLD-MCNC: 189 MG/DL (ref 70–99)
GLUCOSE BLD-MCNC: 204 MG/DL (ref 70–99)
OSMOLALITY SERPL CALC.SUM OF ELEC: 305 MOSM/KG (ref 275–295)
PHOSPHATE SERPL-MCNC: 1.8 MG/DL (ref 2.5–4.9)
POTASSIUM SERPL-SCNC: 3.5 MMOL/L (ref 3.5–5.1)
SODIUM SERPL-SCNC: 144 MMOL/L (ref 136–145)

## 2020-07-19 PROCEDURE — 82962 GLUCOSE BLOOD TEST: CPT

## 2020-07-19 PROCEDURE — 80069 RENAL FUNCTION PANEL: CPT | Performed by: INTERNAL MEDICINE

## 2020-07-19 RX ORDER — POTASSIUM CHLORIDE 20 MEQ/1
40 TABLET, EXTENDED RELEASE ORAL ONCE
Status: COMPLETED | OUTPATIENT
Start: 2020-07-19 | End: 2020-07-19

## 2020-07-19 NOTE — PROGRESS NOTES
[facility] [address] [place] [address] Cardiology  Progress Note    Celsa Ramirez Patient Status:  Inpatient    1945 MRN [id number]   [facility] 3NW-A Attending Annie Camacho MD   [address] Day # 4 PCP Jada Dominguez MD           Impression:  1. AF, paroxysma kg)  05/12/20 : 233 lb (105.7 kg)      General: Awake and alert; in no acute distress  Cardiac: Regular rate and regular rhythm; no murmurs/rubs/gallops are appreciated  Lungs: Clear to auscultation bilaterally; no accessory muscle use  Abdomen: Soft, non- (SUGAR-FREE)) 1 lozenge, 1 lozenge, Buccal, Q15 Min PRN  Heparin Sodium (Porcine) 5000 UNIT/ML injection 5,000 Units, 5,000 Units, Subcutaneous, Q8H MICHELLE  glucose (DEX4) oral liquid 15 g, 15 g, Oral, Q15 Min PRN    Or  Glucose-Vitamin C (DEX-4) chewable tab

## 2020-07-19 NOTE — PROGRESS NOTES
[facility]    Nephrology Progress Note    Shivani Reza Attending:  Clayton Giron MD       SUBJECTIVE:   Tolerating diet  No cp, sob  Urinating well   Has some pain around urostomy but otherwise no other complaints    PHYSICAL EXAM:     Vital Signs: BP 0.69 (L) 07/17/2020    EOSABS 0.10 07/17/2020    BASABS 0.00 07/17/2020    NEUT 83 07/17/2020    LYMPH 7 07/17/2020    MON 2 07/17/2020    EOS 1 07/17/2020    BASO 0 07/17/2020    NEPERCENT 76.4 07/18/2020    LYPERCENT 12.5 07/18/2020    MOPERCENT 7.3 07/1 PRN    Or  HYDROcodone-acetaminophen (NORCO) 5-325 MG per tab 2 tablet, 2 tablet, Oral, Q4H PRN  Naloxone HCl (NARCAN) 0.4 MG/ML injection 0.08 mg, 0.08 mg, Intravenous, Q5 Min PRN  diphenhydrAMINE HCl (BENADRYL) injection 12.5 mg, 12.5 mg, Intravenous, Q4 where he lives; near [place]    Hypocalcemia:  -- corrected to normal with serum albumin and ionized calcium wnl     S/p total urethrectomy:  -- per urology     Afib:  -- now sinus    Nephrology will sign off  Please reconsult if need arises  Patie

## 2020-07-19 NOTE — PLAN OF CARE
Problem: Diabetes/Glucose Control  Goal: Glucose maintained within prescribed range  Description  INTERVENTIONS:  - Monitor Blood Glucose as ordered  - Assess for signs and symptoms of hyperglycemia and hypoglycemia  - Administer ordered medications to m Consider collaborating with pharmacy to review patient's medication profile  Outcome: Progressing  Goal: Maintains adequate nutritional intake (undernourished)  Description  INTERVENTIONS:  - Monitor percentage of each meal consumed  - Identify factors con document skin integrity  - Monitor for areas of redness and/or skin breakdown  - Initiate interventions, skin care algorithm/standards of care as needed  Outcome: Progressing  Goal: Incision(s), wounds(s) or drain site(s) healing without S/S of infection

## 2020-07-19 NOTE — PLAN OF CARE
Problem: Diabetes/Glucose Control  Goal: Glucose maintained within prescribed range  Description  INTERVENTIONS:  - Monitor Blood Glucose as ordered  - Assess for signs and symptoms of hyperglycemia and hypoglycemia  - Administer ordered medications to m to review patient's medication profile  Outcome: Progressing  Goal: Maintains adequate nutritional intake (undernourished)  Description  INTERVENTIONS:  - Monitor percentage of each meal consumed  - Identify factors contributing to decreased intake, treat of redness and/or skin breakdown  - Initiate interventions, skin care algorithm/standards of care as needed  Outcome: Progressing  Goal: Incision(s), wounds(s) or drain site(s) healing without S/S of infection  Description  INTERVENTIONS:  - Assess and doc place. Sutures noted to rectum appear clean, dry & intact. POC discussed with pt, pt verbalized understanding. Will continue to monitor.

## 2020-07-19 NOTE — PROGRESS NOTES
[facility]  Progress Note    Sonia Arango Patient Status:  Inpatient    1945 MRN [id number]   [facility] 3NW-A Attending Swathi Duenas MD   [facility] Day # 4 PCP Concepcion Yousif MD     Subjective:  Doing very well. Tolerating a diet.

## 2020-07-19 NOTE — PROGRESS NOTES
7/19/2020 LEFT ABDOMINAL JUAN JOSÉ DRAIN REMOVED BY WRITER, PER ORDER, AT THIS TIME. PT TOLERATED WELL. PRESSURE DRESSING PLACED AND IS C/D/I. ALL QUESTIONS ANSWERED. CONT TO MONITOR.

## 2020-07-19 NOTE — PROGRESS NOTES
[facility] hospitalist daily note  Seen/examined on 7/19/20    S;  Pt seen and examined. Sitting in chair. Feels about the same. On 2L but was needing during sleep. Sig other does report snoring. Eating ok. Pt concerned about stool output from rectum.      Medica CK  -downtrended     Hypophos    Hypoxia  -needing when sleep, would recommend jace eval as o/p     Preventative heparin ordered    Plan of care d/w pt and sig otherwho agree. D/w RN Desi Leung MD  [facility] Hospitalist  [phone]

## 2020-07-19 NOTE — PROGRESS NOTES
[facility]  Urology Progress Note    Jhoan Johns Patient Status:  Inpatient    1945 MRN [id number]   [facility] 3NW-A Attending Ruby Herndon MD   [facility] Day # 4 PCP Kiran Metz MD     Assessment/Plan:      Rectal laceration

## 2020-07-20 VITALS
TEMPERATURE: 98 F | WEIGHT: 237.44 LBS | SYSTOLIC BLOOD PRESSURE: 160 MMHG | OXYGEN SATURATION: 99 % | RESPIRATION RATE: 18 BRPM | DIASTOLIC BLOOD PRESSURE: 84 MMHG | HEART RATE: 68 BPM | BODY MASS INDEX: 33.24 KG/M2 | HEIGHT: 71 IN

## 2020-07-20 LAB
ALBUMIN SERPL-MCNC: 2 G/DL (ref 3.4–5)
ANION GAP SERPL CALC-SCNC: 3 MMOL/L (ref 0–18)
BUN BLD-MCNC: 21 MG/DL (ref 7–18)
BUN/CREAT SERPL: 19.1 (ref 10–20)
CALCIUM BLD-MCNC: 8 MG/DL (ref 8.5–10.1)
CHLORIDE SERPL-SCNC: 116 MMOL/L (ref 98–112)
CO2 SERPL-SCNC: 25 MMOL/L (ref 21–32)
CREAT BLD-MCNC: 1.1 MG/DL (ref 0.7–1.3)
GLUCOSE BLD-MCNC: 135 MG/DL (ref 70–99)
GLUCOSE BLD-MCNC: 192 MG/DL (ref 70–99)
GLUCOSE BLD-MCNC: 263 MG/DL (ref 70–99)
HAV IGM SER QL: 1.9 MG/DL (ref 1.6–2.6)
OSMOLALITY SERPL CALC.SUM OF ELEC: 303 MOSM/KG (ref 275–295)
PHOSPHATE SERPL-MCNC: 2.4 MG/DL (ref 2.5–4.9)
POTASSIUM SERPL-SCNC: 3.5 MMOL/L (ref 3.5–5.1)
SODIUM SERPL-SCNC: 144 MMOL/L (ref 136–145)

## 2020-07-20 PROCEDURE — 97530 THERAPEUTIC ACTIVITIES: CPT

## 2020-07-20 PROCEDURE — 82962 GLUCOSE BLOOD TEST: CPT

## 2020-07-20 PROCEDURE — 80069 RENAL FUNCTION PANEL: CPT | Performed by: INTERNAL MEDICINE

## 2020-07-20 PROCEDURE — 83735 ASSAY OF MAGNESIUM: CPT | Performed by: NURSE PRACTITIONER

## 2020-07-20 PROCEDURE — 97116 GAIT TRAINING THERAPY: CPT

## 2020-07-20 RX ORDER — METOPROLOL SUCCINATE 25 MG/1
25 TABLET, EXTENDED RELEASE ORAL
Qty: 90 TABLET | Refills: 3 | Status: SHIPPED | OUTPATIENT
Start: 2020-07-21

## 2020-07-20 NOTE — PROGRESS NOTES
[facility] [address] [place] [address] Cardiology  Progress Note    Jorden Bonner Patient Status:  Inpatient    1945 MRN [id number]   [facility] 3NW-A Attending Kari Corbett MD   [facility] Day # 5 PCP Roel Anne MD           Impression:  1. AF, paroxysma rhythm; no murmurs/rubs/gallops are appreciated  Lungs: Clear to auscultation bilaterally; no accessory muscle use  Abdomen: Soft, non-tender; bowel sounds are normoactive  Extremities: No clubbing/cyanosis; moves all 4 extremities normally    insulin dete g, Oral, Q15 Min PRN    Or  Glucose-Vitamin C (DEX-4) chewable tab 4 tablet, 4 tablet, Oral, Q15 Min PRN    Or  dextrose 50 % injection 50 mL, 50 mL, Intravenous, Q15 Min PRN    Or  glucose (DEX4) oral liquid 30 g, 30 g, Oral, Q15 Min PRN    Or  Glucose-Vi

## 2020-07-20 NOTE — PROGRESS NOTES
[facility] hospitalist daily note  Seen/examined on 7/20/20    S;  Pt seen and examined. Sitting in chair. Pain ok. No n/v. On RA. Walked.  Watery BM     Medications in Epic    PE    07/20/20  0900   BP: 160/84   Pulse:    Resp: 18   Temp: 98.2 °F (36.8 °C)     G Preventative heparin ordered    Plan of care d/w pt and sig otherwho agree.   D/w RN Sharen Fleischer via Ivan Love [phone], MD  [facility] Hospitalist  [phone]  7/20/2020  2:36 PM

## 2020-07-20 NOTE — PROGRESS NOTES
[facility]  Urology Progress Note    Dillon Lau Patient Status:  Inpatient    1945 MRN [id number]   [facility] 3NW-A Attending Jade Deluna MD   [facility] Umer Cole MD     Subjective:  Dillon Lau is a(n) 76 year o

## 2020-07-20 NOTE — PROGRESS NOTES
[facility]    Nephrology Progress Note    Althea Norwood Attending:  Rosaura Steve MD       SUBJECTIVE:   Tolerating diet  No cp, sob  Urinating well   Has some pain around urostomy now resolved     PHYSICAL EXAM:     Vital Signs: /84 (BP Location 83 07/17/2020    LYMPH 7 07/17/2020    MON 2 07/17/2020    EOS 1 07/17/2020    BASO 0 07/17/2020    NEPERCENT 76.4 07/18/2020    LYPERCENT 12.5 07/18/2020    MOPERCENT 7.3 07/18/2020    EOPERCENT 2.6 07/18/2020    BAPERCENT 0.3 07/18/2020    NE 5.90 07/18/ Oral, Q4H PRN  Naloxone HCl (NARCAN) 0.4 MG/ML injection 0.08 mg, 0.08 mg, Intravenous, Q5 Min PRN  diphenhydrAMINE HCl (BENADRYL) injection 12.5 mg, 12.5 mg, Intravenous, Q4H PRN  lactated ringers infusion, , Intravenous, Continuous  acetaminophen (TYLENO urology     Afib:  -- now sinus    Ok to discharge today from renal perspective.       iGla Chatman, [address] [facility] Nephrology

## 2020-07-20 NOTE — PLAN OF CARE
A&Ox4. VSS. RA. . Tele-NSR-SB. Abdomen soft, rounded, tender. Denies nausea. Tolerating diet. Passing gas and stool via ostomy bag. Midline with staples is intact with no drainage. Ileal conduit intact draining clear yellow urine.  Scrotum is swollen wit needed  - Evaluate fluid balance  Outcome: Progressing  Goal: Maintains or returns to baseline bowel function  Description  INTERVENTIONS:  - Assess bowel function  - Maintain adequate hydration with IV or PO as ordered and tolerated  - Evaluate effectiven activity/tolerance for mobility and gait  - Educate and engage patient/family in tolerated activity level and precautions  - Recommendation undetermined for transfers and ambulation at present.    Outcome: Progressing     Problem: SKIN/TISSUE INTEGRITY - AD

## 2020-07-20 NOTE — DIETARY NOTE
[address] [facility] [address]     Admitting diagnosis:  Urothelial carcinoma ([facility] [address] [C68.9]    Ht: 180.3 cm (5' 11\")  Wt: 107.7 kg (237 lb 7 oz). This is 137% of IBW  Body mass index is 33.12 kg/m².   IBW: 78kg  Wt Readings from Last

## 2020-07-20 NOTE — PROGRESS NOTES
[facility]  Progress Note    [facility] Patient Status:  Inpatient    1945 MRN [id number]   [facility] 3NW-A Attending Davidson Fonseca MD   [facility] Day # 5 PCP Karie Oliva MD     Subjective:  Feeling well.   Pain in right lower kat

## 2020-07-20 NOTE — PLAN OF CARE
Problem: Diabetes/Glucose Control  Goal: Glucose maintained within prescribed range  Description  INTERVENTIONS:  - Monitor Blood Glucose as ordered  - Assess for signs and symptoms of hyperglycemia and hypoglycemia  - Administer ordered medications to m Consider collaborating with pharmacy to review patient's medication profile  Outcome: Progressing  Goal: Maintains adequate nutritional intake (undernourished)  Description  INTERVENTIONS:  - Monitor percentage of each meal consumed  - Identify factors con dressing/incision, wound bed, drain sites and surrounding tissue  - Implement wound care per orders  - Initiate isolation precautions as appropriate  - Initiate Pressure Ulcer prevention bundle as indicated  Outcome: Progressing     Problem: PAIN - ADULT

## 2020-07-20 NOTE — PROGRESS NOTES
7/19/2020 Call placed to Dr. Dejesus Homes to inform him that pt and pt's care partner still do not feel comfortable being d/c home tonight. Awaiting return call. Pt states he wants to work with P. T.on stairs tomorrow. Writer spoke to Nabil CHAVES who confirmed R

## 2020-07-20 NOTE — PHYSICAL THERAPY NOTE
PHYSICAL THERAPY TREATMENT NOTE - INPATIENT    Room Number: 150/685-W     Session: 1/5   Number of Visits to Meet Established Goals: 5    Presenting Problem: s/p uretectomy with rectal tear;s/p repair of rectal tear with colostomy     History related to c BEARING RESTRICTION  Weight Bearing Restriction: None                PAIN ASSESSMENT   Rating: 3  Location: abdomen  Management Techniques: Activity promotion; Body mechanics;Repositioning    BALANCE ann    Patient End of Session: Up in chair;Needs met;Call light within reach;RN aware of session/findings; All patient questions and concerns addressed    ASSESSMENT   Pt seen this am for treatment and demonstrating excellent progress towards all goals and

## 2020-07-21 NOTE — PROGRESS NOTES
NURSING DISCHARGE NOTE    Discharged Home via Wheelchair. Accompanied by Support staff  Belongings Taken by patient/family. PT DISCHARGED TO HOME WITH [facility]IDENTIAL [facility] IN STABLE CONDITION.  [facility]

## 2020-07-23 ENCOUNTER — TELEPHONE (OUTPATIENT)
Dept: WOUND CARE | Facility: HOSPITAL | Age: 75
End: 2020-07-23

## 2020-08-03 NOTE — DISCHARGE SUMMARY
[facility]  Discharge Summary    Jenni Byers Patient Status:  Inpatient    1945 MRN [id number]   [facility] 3NW-A Attending No att. providers found   Hosp Day # 5 PCP Mray Del Cid MD     Date of Admission: 2020    Date of past several weeks. Given course of antibiotics (ciprofloxacin) by his primary for this and hematuria since resolved; however, persistent fluid/mucous discharge from urethra.    Office cystoscopy 4/24/20 - scarring at proximal end at prior connection with t of intra-abdominal adhesions, creation of end colostomy with Hurshel Challenger pouch (Dr. Mike Caballero)    Complications: Rectal laceration intra-operative    Disposition: [address]    Discharge Condition: Stable    Discharge Medications: Discharge

## 2020-08-18 PROBLEM — N48.1 BALANITIS: Status: ACTIVE | Noted: 2020-08-18

## 2020-10-06 PROBLEM — C79.10 METASTATIC UROTHELIAL CARCINOMA (HCC): Status: ACTIVE | Noted: 2020-10-06

## 2020-10-06 PROBLEM — C79.51 BONE METASTASES: Status: ACTIVE | Noted: 2020-10-06

## 2020-10-06 PROBLEM — C79.51 BONE METASTASES (HCC): Status: ACTIVE | Noted: 2020-10-06

## 2020-10-18 PROBLEM — I48.0 PAROXYSMAL ATRIAL FIBRILLATION (HCC): Status: ACTIVE | Noted: 2020-10-18

## 2020-10-18 PROBLEM — R06.81 APNEA: Status: ACTIVE | Noted: 2020-10-18

## 2020-10-18 PROBLEM — R00.0 TACHYCARDIA: Status: ACTIVE | Noted: 2020-10-18

## 2020-10-18 PROBLEM — G47.33 OBSTRUCTIVE SLEEP APNEA: Status: ACTIVE | Noted: 2020-10-18

## 2021-04-11 DIAGNOSIS — Z23 NEED FOR VACCINATION: ICD-10-CM

## 2021-05-26 VITALS
BODY MASS INDEX: 32.07 KG/M2 | TEMPERATURE: 96.1 F | WEIGHT: 229.06 LBS | RESPIRATION RATE: 15 BRPM | HEART RATE: 66 BPM | DIASTOLIC BLOOD PRESSURE: 77 MMHG | SYSTOLIC BLOOD PRESSURE: 131 MMHG | HEIGHT: 71 IN | OXYGEN SATURATION: 94 %

## (undated) DEVICE — PROXIMATE SKIN STAPLERS (35 WIDE) CONTAINS 35 STAINLESS STEEL STAPLES (FIXED HEAD): Brand: PROXIMATE

## (undated) DEVICE — SUTURE VICRYL 4-0 SH

## (undated) DEVICE — SUTURE VICRYL 4-0 RB-1

## (undated) DEVICE — COVER,MAYO STAND,STERILE: Brand: MEDLINE

## (undated) DEVICE — STANDARD HYPODERMIC NEEDLE,POLYPROPYLENE HUB: Brand: MONOJECT

## (undated) DEVICE — DRESSING AQUACEL AG 3.5 X 10

## (undated) DEVICE — SOL  .9 1000ML BTL

## (undated) DEVICE — Device

## (undated) DEVICE — CHLORAPREP 26ML APPLICATOR

## (undated) DEVICE — TUBING CYSTO TUR DUAL

## (undated) DEVICE — REM POLYHESIVE ADULT PATIENT RETURN ELECTRODE: Brand: VALLEYLAB

## (undated) DEVICE — LAWSON - CONTAINER SPCMN STL 5OZ

## (undated) DEVICE — CONTOUR CURVED CUTTER STAPLER RELOAD: Brand: CONTOUR

## (undated) DEVICE — SUPER SPONGES,MEDIUM: Brand: KERLIX

## (undated) DEVICE — PROXIMATE RELOADABLE LINEAR CUTTER WITH SAFETY LOCK-OUT, 75MM: Brand: PROXIMATE

## (undated) DEVICE — SIGMOIDOSCOPE LIGHTED BIOSEAL

## (undated) DEVICE — COVER,BOOT,FOAM,NON-SKID,HOOK-LOOP,XLG: Brand: MEDLINE INDUSTRIES, INC.

## (undated) DEVICE — STRL PENROSE DRAIN 18" X 1/4": Brand: CARDINAL HEALTH

## (undated) DEVICE — 450 ML BOTTLE OF 0.05% CHLORHEXIDINE GLUCONATE IN 99.95% STERILE WATER FOR IRRIGATION, USP AND APPLICATOR.: Brand: IRRISEPT ANTIMICROBIAL WOUND LAVAGE

## (undated) DEVICE — RETRACTOR LONE STAR STAYS BLNT

## (undated) DEVICE — GYN CDS: Brand: MEDLINE INDUSTRIES, INC.

## (undated) DEVICE — STERILE POLYISOPRENE POWDER-FREE SURGICAL GLOVES: Brand: PROTEXIS

## (undated) DEVICE — TOWEL: OR BLU 80/CS: Brand: MEDICAL ACTION INDUSTRIES

## (undated) DEVICE — CAUTERY NEEDLE 2IN INS E1465

## (undated) DEVICE — PREMIUM WET SKIN PREP TRAY: Brand: MEDLINE INDUSTRIES, INC.

## (undated) DEVICE — CAUTERY PENCIL

## (undated) DEVICE — RETRACTOR LONE STAR LARGE

## (undated) DEVICE — SUTURE MONOCRYL 4-0 PS-2

## (undated) DEVICE — KENDALL SCD EXPRESS SLEEVES, KNEE LENGTH, MEDIUM: Brand: KENDALL SCD

## (undated) DEVICE — SUTURE SILK 3-0 SH

## (undated) DEVICE — LIGASURE IMPACT OPEN DEVICE

## (undated) DEVICE — SUTURE CHROMIC GUT 4-0 RB-1

## (undated) DEVICE — UNDYED BRAIDED (POLYGLACTIN 910), SYNTHETIC ABSORBABLE SUTURE: Brand: COATED VICRYL

## (undated) DEVICE — DRAIN SILICONE FLAT 10MM

## (undated) DEVICE — MEDI-VAC NON-CONDUCTIVE SUCTION TUBING: Brand: CARDINAL HEALTH

## (undated) DEVICE — LAPAROTOMY SPONGE - RF AND X-RAY DETECTABLE PRE-WASHED: Brand: SITUATE

## (undated) DEVICE — VIOLET BRAIDED (POLYGLACTIN 910), SYNTHETIC ABSORBABLE SUTURE: Brand: COATED VICRYL

## (undated) DEVICE — SUTURE SILK 2-0 SH

## (undated) DEVICE — SYRINGE 10ML LL TIP

## (undated) DEVICE — MEDI-VAC SUCTION HANDLE REGULAR CAPACITY: Brand: CARDINAL HEALTH

## (undated) DEVICE — SUTURE PDS II 4-0 RB-1

## (undated) DEVICE — SPECIMEN CONTAINER,POSITIVE SEAL INDICATOR, OR PACKAGED: Brand: PRECISION

## (undated) DEVICE — SUTURE PDS II 1 ST-21

## (undated) DEVICE — SYRINGE 10ML LL CONTRL SYRINGE

## (undated) DEVICE — BULB SYRINGE,IRRIGATION WITH PROTECTIVE CAP: Brand: DOVER

## (undated) DEVICE — SUTURE VICRYL 3-0 SH

## (undated) DEVICE — CONTOUR CURVED CUTTER STAPLER: Brand: CONTOUR

## (undated) DEVICE — SUTURE VICRYL 3-0 RB-1

## (undated) DEVICE — DERMABOND LIQUID ADHESIVE

## (undated) NOTE — LETTER
Ashlyn Mauricio [address]  [address] [facility] [address], [address] [facility]  Authorization for Surgical Operation and Procedure     Date:___________                                                                                                         Time:__________ potential risks that can occur: fever and allergic reactions, hemolytic reactions, transmission of diseases such as Hepatitis, AIDS and Cytomegalovirus (CMV) and fluid overload.   In the event that I wish to have an autologous transfusion of my own blood, o attending physician will determine when the applicable recovery period ends for purposes of reinstating the DNAR order.   10. Patients having a sterilization procedure: I understand that if the procedure is successful the results will be permanent and it wi a. Allow the anesthesiologist (anesthesia doctor) to give me medicine and do additional procedures as necessary.  Some examples are: Starting or using an “IV” to give me medicine, fluids or blood during my procedure, and having a breathing tube placed to he 7. Regional Anesthesia (“spinal”, “epidural”, & “nerve blocks”): I understand that rare but potential complications include headache, bleeding, infection, seizure, irregular heart rhythms, and nerve injury.     I can change my mind about having anesthesia